# Patient Record
Sex: FEMALE | Race: WHITE | NOT HISPANIC OR LATINO | URBAN - METROPOLITAN AREA
[De-identification: names, ages, dates, MRNs, and addresses within clinical notes are randomized per-mention and may not be internally consistent; named-entity substitution may affect disease eponyms.]

---

## 2021-03-16 ENCOUNTER — INPATIENT (INPATIENT)
Facility: HOSPITAL | Age: 31
LOS: 0 days | Discharge: HOME | DRG: 897 | End: 2021-03-17
Attending: INTERNAL MEDICINE | Admitting: INTERNAL MEDICINE
Payer: COMMERCIAL

## 2021-03-16 VITALS
HEIGHT: 65 IN | DIASTOLIC BLOOD PRESSURE: 98 MMHG | TEMPERATURE: 98 F | RESPIRATION RATE: 18 BRPM | WEIGHT: 160.06 LBS | SYSTOLIC BLOOD PRESSURE: 144 MMHG | OXYGEN SATURATION: 99 % | HEART RATE: 116 BPM

## 2021-03-16 DIAGNOSIS — F10.10 ALCOHOL ABUSE, UNCOMPLICATED: ICD-10-CM

## 2021-03-16 DIAGNOSIS — R63.8 OTHER SYMPTOMS AND SIGNS CONCERNING FOOD AND FLUID INTAKE: ICD-10-CM

## 2021-03-16 DIAGNOSIS — F19.239 OTHER PSYCHOACTIVE SUBSTANCE DEPENDENCE WITH WITHDRAWAL, UNSPECIFIED: ICD-10-CM

## 2021-03-16 DIAGNOSIS — R74.01 ELEVATION OF LEVELS OF LIVER TRANSAMINASE LEVELS: ICD-10-CM

## 2021-03-16 DIAGNOSIS — F41.9 ANXIETY DISORDER, UNSPECIFIED: ICD-10-CM

## 2021-03-16 LAB
ALBUMIN SERPL ELPH-MCNC: 5.2 G/DL — HIGH (ref 3.3–5)
ALP SERPL-CCNC: 108 U/L — SIGNIFICANT CHANGE UP (ref 40–120)
ALT FLD-CCNC: 91 U/L — HIGH (ref 10–45)
ANION GAP SERPL CALC-SCNC: 18 MMOL/L — HIGH (ref 5–17)
APAP SERPL-MCNC: <5 UG/ML — LOW (ref 10–30)
APPEARANCE UR: CLEAR — SIGNIFICANT CHANGE UP
AST SERPL-CCNC: 95 U/L — HIGH (ref 10–40)
BASOPHILS # BLD AUTO: 0.05 K/UL — SIGNIFICANT CHANGE UP (ref 0–0.2)
BASOPHILS NFR BLD AUTO: 1 % — SIGNIFICANT CHANGE UP (ref 0–2)
BILIRUB SERPL-MCNC: 0.2 MG/DL — SIGNIFICANT CHANGE UP (ref 0.2–1.2)
BILIRUB UR-MCNC: NEGATIVE — SIGNIFICANT CHANGE UP
BUN SERPL-MCNC: 6 MG/DL — LOW (ref 7–23)
CALCIUM SERPL-MCNC: 9.9 MG/DL — SIGNIFICANT CHANGE UP (ref 8.4–10.5)
CHLORIDE SERPL-SCNC: 96 MMOL/L — SIGNIFICANT CHANGE UP (ref 96–108)
CO2 SERPL-SCNC: 26 MMOL/L — SIGNIFICANT CHANGE UP (ref 22–31)
COLOR SPEC: YELLOW — SIGNIFICANT CHANGE UP
CREAT SERPL-MCNC: 0.83 MG/DL — SIGNIFICANT CHANGE UP (ref 0.5–1.3)
DIFF PNL FLD: NEGATIVE — SIGNIFICANT CHANGE UP
EOSINOPHIL # BLD AUTO: 0.02 K/UL — SIGNIFICANT CHANGE UP (ref 0–0.5)
EOSINOPHIL NFR BLD AUTO: 0.4 % — SIGNIFICANT CHANGE UP (ref 0–6)
ETHANOL SERPL-MCNC: 406 MG/DL — HIGH (ref 0–10)
GLUCOSE SERPL-MCNC: 97 MG/DL — SIGNIFICANT CHANGE UP (ref 70–99)
GLUCOSE UR QL: NEGATIVE — SIGNIFICANT CHANGE UP
HCG SERPL-ACNC: <0 MIU/ML — SIGNIFICANT CHANGE UP
HCT VFR BLD CALC: 39.8 % — SIGNIFICANT CHANGE UP (ref 34.5–45)
HGB BLD-MCNC: 13.6 G/DL — SIGNIFICANT CHANGE UP (ref 11.5–15.5)
IMM GRANULOCYTES NFR BLD AUTO: 0.2 % — SIGNIFICANT CHANGE UP (ref 0–1.5)
KETONES UR-MCNC: NEGATIVE — SIGNIFICANT CHANGE UP
LACTATE SERPL-SCNC: 3.8 MMOL/L — HIGH (ref 0.5–2)
LACTATE SERPL-SCNC: 5.6 MMOL/L — CRITICAL HIGH (ref 0.5–2)
LEUKOCYTE ESTERASE UR-ACNC: NEGATIVE — SIGNIFICANT CHANGE UP
LYMPHOCYTES # BLD AUTO: 2.32 K/UL — SIGNIFICANT CHANGE UP (ref 1–3.3)
LYMPHOCYTES # BLD AUTO: 45.2 % — HIGH (ref 13–44)
MCHC RBC-ENTMCNC: 32.6 PG — SIGNIFICANT CHANGE UP (ref 27–34)
MCHC RBC-ENTMCNC: 34.2 GM/DL — SIGNIFICANT CHANGE UP (ref 32–36)
MCV RBC AUTO: 95.4 FL — SIGNIFICANT CHANGE UP (ref 80–100)
MONOCYTES # BLD AUTO: 0.29 K/UL — SIGNIFICANT CHANGE UP (ref 0–0.9)
MONOCYTES NFR BLD AUTO: 5.7 % — SIGNIFICANT CHANGE UP (ref 2–14)
NEUTROPHILS # BLD AUTO: 2.44 K/UL — SIGNIFICANT CHANGE UP (ref 1.8–7.4)
NEUTROPHILS NFR BLD AUTO: 47.5 % — SIGNIFICANT CHANGE UP (ref 43–77)
NITRITE UR-MCNC: NEGATIVE — SIGNIFICANT CHANGE UP
NRBC # BLD: 0 /100 WBCS — SIGNIFICANT CHANGE UP (ref 0–0)
PCP SPEC-MCNC: SIGNIFICANT CHANGE UP
PH UR: 6 — SIGNIFICANT CHANGE UP (ref 5–8)
PLATELET # BLD AUTO: 327 K/UL — SIGNIFICANT CHANGE UP (ref 150–400)
POTASSIUM SERPL-MCNC: 3.9 MMOL/L — SIGNIFICANT CHANGE UP (ref 3.5–5.3)
POTASSIUM SERPL-SCNC: 3.9 MMOL/L — SIGNIFICANT CHANGE UP (ref 3.5–5.3)
PROT SERPL-MCNC: 8.5 G/DL — HIGH (ref 6–8.3)
PROT UR-MCNC: NEGATIVE MG/DL — SIGNIFICANT CHANGE UP
RBC # BLD: 4.17 M/UL — SIGNIFICANT CHANGE UP (ref 3.8–5.2)
RBC # FLD: 14.5 % — SIGNIFICANT CHANGE UP (ref 10.3–14.5)
SALICYLATES SERPL-MCNC: <0.3 MG/DL — LOW (ref 2.8–20)
SARS-COV-2 RNA SPEC QL NAA+PROBE: SIGNIFICANT CHANGE UP
SODIUM SERPL-SCNC: 140 MMOL/L — SIGNIFICANT CHANGE UP (ref 135–145)
SP GR SPEC: <=1.005 — SIGNIFICANT CHANGE UP (ref 1–1.03)
UROBILINOGEN FLD QL: 0.2 E.U./DL — SIGNIFICANT CHANGE UP
WBC # BLD: 5.13 K/UL — SIGNIFICANT CHANGE UP (ref 3.8–10.5)
WBC # FLD AUTO: 5.13 K/UL — SIGNIFICANT CHANGE UP (ref 3.8–10.5)

## 2021-03-16 PROCEDURE — 93010 ELECTROCARDIOGRAM REPORT: CPT

## 2021-03-16 PROCEDURE — 99291 CRITICAL CARE FIRST HOUR: CPT

## 2021-03-16 PROCEDURE — 70450 CT HEAD/BRAIN W/O DYE: CPT | Mod: 26,MA

## 2021-03-16 PROCEDURE — 99222 1ST HOSP IP/OBS MODERATE 55: CPT | Mod: GC

## 2021-03-16 RX ORDER — SODIUM CHLORIDE 9 MG/ML
1000 INJECTION INTRAMUSCULAR; INTRAVENOUS; SUBCUTANEOUS ONCE
Refills: 0 | Status: COMPLETED | OUTPATIENT
Start: 2021-03-16 | End: 2021-03-16

## 2021-03-16 RX ORDER — THIAMINE MONONITRATE (VIT B1) 100 MG
100 TABLET ORAL DAILY
Refills: 0 | Status: DISCONTINUED | OUTPATIENT
Start: 2021-03-16 | End: 2021-03-17

## 2021-03-16 RX ORDER — LEVETIRACETAM 250 MG/1
1 TABLET, FILM COATED ORAL
Qty: 0 | Refills: 0 | DISCHARGE

## 2021-03-16 RX ORDER — LEVETIRACETAM 250 MG/1
1000 TABLET, FILM COATED ORAL ONCE
Refills: 0 | Status: COMPLETED | OUTPATIENT
Start: 2021-03-16 | End: 2021-03-16

## 2021-03-16 RX ORDER — ESCITALOPRAM OXALATE 10 MG/1
20 TABLET, FILM COATED ORAL DAILY
Refills: 0 | Status: DISCONTINUED | OUTPATIENT
Start: 2021-03-17 | End: 2021-03-17

## 2021-03-16 RX ORDER — FOLIC ACID 0.8 MG
1 TABLET ORAL DAILY
Refills: 0 | Status: DISCONTINUED | OUTPATIENT
Start: 2021-03-16 | End: 2021-03-17

## 2021-03-16 RX ORDER — LEVETIRACETAM 250 MG/1
1000 TABLET, FILM COATED ORAL
Refills: 0 | Status: DISCONTINUED | OUTPATIENT
Start: 2021-03-17 | End: 2021-03-17

## 2021-03-16 RX ORDER — TRAZODONE HCL 50 MG
1 TABLET ORAL
Qty: 0 | Refills: 0 | DISCHARGE

## 2021-03-16 RX ORDER — METHOCARBAMOL 500 MG/1
0 TABLET, FILM COATED ORAL
Qty: 0 | Refills: 0 | DISCHARGE

## 2021-03-16 RX ORDER — HYDROXYZINE HCL 10 MG
1 TABLET ORAL
Qty: 0 | Refills: 0 | DISCHARGE

## 2021-03-16 RX ORDER — GABAPENTIN 400 MG/1
1 CAPSULE ORAL
Qty: 0 | Refills: 0 | DISCHARGE

## 2021-03-16 RX ORDER — ESCITALOPRAM OXALATE 10 MG/1
1 TABLET, FILM COATED ORAL
Qty: 0 | Refills: 0 | DISCHARGE

## 2021-03-16 RX ADMIN — Medication 100 MILLIGRAM(S): at 22:51

## 2021-03-16 RX ADMIN — Medication 1 MILLIGRAM(S): at 22:51

## 2021-03-16 RX ADMIN — LEVETIRACETAM 400 MILLIGRAM(S): 250 TABLET, FILM COATED ORAL at 19:05

## 2021-03-16 RX ADMIN — Medication 1 MILLIGRAM(S): at 22:15

## 2021-03-16 RX ADMIN — Medication 25 MILLIGRAM(S): at 20:31

## 2021-03-16 RX ADMIN — SODIUM CHLORIDE 1000 MILLILITER(S): 9 INJECTION INTRAMUSCULAR; INTRAVENOUS; SUBCUTANEOUS at 22:08

## 2021-03-16 RX ADMIN — SODIUM CHLORIDE 1000 MILLILITER(S): 9 INJECTION INTRAMUSCULAR; INTRAVENOUS; SUBCUTANEOUS at 22:29

## 2021-03-16 RX ADMIN — Medication 1 MILLIGRAM(S): at 22:17

## 2021-03-16 RX ADMIN — LEVETIRACETAM 1000 MILLIGRAM(S): 250 TABLET, FILM COATED ORAL at 22:08

## 2021-03-16 RX ADMIN — SODIUM CHLORIDE 1000 MILLILITER(S): 9 INJECTION INTRAMUSCULAR; INTRAVENOUS; SUBCUTANEOUS at 20:04

## 2021-03-16 RX ADMIN — Medication 1 MILLIGRAM(S): at 22:29

## 2021-03-16 RX ADMIN — Medication 1 TABLET(S): at 22:51

## 2021-03-16 RX ADMIN — SODIUM CHLORIDE 1000 MILLILITER(S): 9 INJECTION INTRAMUSCULAR; INTRAVENOUS; SUBCUTANEOUS at 19:05

## 2021-03-16 RX ADMIN — Medication 1 MILLIGRAM(S): at 21:27

## 2021-03-16 NOTE — ED PROVIDER NOTE - PHYSICAL EXAMINATION
GEN: Well developed, obese, intoxicated, awake, alert, oriented to person, place, anxious, seized on arrival to the ER. NTAF  ENT: Airway patent, Nasal mucosa clear. Mouth with normal mucosa.  EYES: Clear bilaterally. PERRL, EOMI  RESPIRATORY: Breathing comfortably with normal RR. No W/C/R, no hypoxia or resp distress.  CARDIAC: Regular rate and rhythm, no M/R/G  ABDOMEN: Soft, nontender, +bowel sounds, no rebound, rigidity, or guarding.  MSK: Range of motion is not limited, no deformities noted.  NEURO: Alert and oriented x 2 (thought it was Wednesday). Cn 2-12 intact. Strength 5/5 and sensation intact in all 4 extremities. no tremors. Gait normal.   SKIN: Skin normal color for race, warm, dry and intact. Resolving bruising to forehead  PSYCH: Alert and oriented to person, place, anxious mood and affect. no apparent risk to self or others.

## 2021-03-16 NOTE — CONSULT NOTE ADULT - ATTENDING COMMENTS
etoh withdrawal  etoh associated SZ  dehydratino with dry MM, she stated she has not been drinking or eating for days  she repeatedly stated she watned to leave from ED to go to detox rehab despite being informed about risks of recurrent SZ, and meredith tshe was not medicably stable for rehab.    treating with keppra  admit to medical stepdown to monitor for progression of withdrawal and recurrent SZ  treat with iv benzo as required for symptomatic withdrawal.   does not need necessarily 2mg ativan q2.  IVF for dehydration   patient does not have capacity to sign out AMA currently etoh withdrawal  etoh associated SZ  dehydratino with dry MM, she stated she has not been drinking or eating for days  she repeatedly stated she watned to leave from ED to go to detox rehab despite being informed about risks of recurrent SZ, and meredith tshe was not medicably stable for rehab.    treating with keppra  admit to medical stepdown to monitor for progression of withdrawal and recurrent SZ  treat with iv benzo as required for symptomatic withdrawal.   does not need necessarily 2mg ativan q2.    see also H & P authored by Dr. Martinez this evening  IVF for dehydration   patient does not have capacity to sign out AMA currently

## 2021-03-16 NOTE — H&P ADULT - ASSESSMENT
Ms. Radha Valente is a 30 year old female with medical history significant for anxiety, depression, alcohol abuse disorder who presented to the ED this evening for EtOH withdrawal complicated by 3 seizures, one of which was witnessed in the ED, admitted to  for management and monitoring of EtOH withdrawal and seizure.

## 2021-03-16 NOTE — ED PROVIDER NOTE - CLINICAL SUMMARY MEDICAL DECISION MAKING FREE TEXT BOX
30F with a h/o anxiety and alcohol abuse with hx of alcohol withdrawal seizures who p/w seizure. Pt recently relapsed after a 5 days detox program and has been binge drinking for the past few days (~25 beers per day). Tonight she was on her way to detox when she had 3 seizures, tonic clonic that lasted about 1 minute each, last was on stretched while EMS was transporting pt into the ED. Pt states her last drink was about 6 hours ago, she c/o feeling shakey and anxious, no skin crawling, no nausea or vomits, no headache, no si/hi. States she takes keppra 500 mg BID rx'd by her neuropsych, but she denies hx of epilepsy.   CIWA 10, VS notable for tachycardia, pt appears dry on exam. EKG ST no stemi, Plan for labs, IV hydration, CT head r/o ICH. IV keppra given for sz prophylaxis.     Pt given librium and ativan for increasing anxiety, she tried to elope from ER, cannot leave AMA at ETOH level > 400s and pt does not have capacity at this time and high risk for recurrent seizure. EPilepsy was consulted and will see her during admission. ICU consulted for admission to monitored bed given tachycardia and high risk for seizure.

## 2021-03-16 NOTE — CONSULT NOTE ADULT - SUBJECTIVE AND OBJECTIVE BOX
***ICU CONSULT****    TERRY SIMS  30y  Female    30yoF with a PMH of eoth abuse, seizures, anxiety, and depression presents with seizures and etoh withdrawal. Patient was on her way to [facility] when she had a tonic clonic seizure that lasted one minute and self terminated. She had another seizure on the way to [facility] and one witnessed seizure in the ED. Last drink was 6-8 hours ago, pt reports she drinks 10-12 hard seltzers per day but told another staff members its closer to 30. Her partner Christy states that the patient has 2-3 tonic clonic seizure daily at home whenever she is drinking. Last December she admitted to an outside hospital for eoth withdrawal, wasn't intubated. There she had an EEG which reportedly did not show evidence of epilepsy. On arrival she was tachycardic NSR in the 110/120's but otherwise AF VSS on room air. Exam remarkable for anxiety and tremors but otherwise benign. Labs s/f lactate 5.6, anion gap of 18, and LOREN of 400. EKG NSR tachycardic QTc 420. CTH unremarkable. In the ED was given 4mg ativan and 25mg of librium plus 3L of NS.          PAST MEDICAL/SURGICAL HISTORY  PAST MEDICAL & SURGICAL HISTORY:  Anxiety    Withdrawal seizures    Alcohol abuse        REVIEW OF SYSTEMS:  CONSTITUTIONAL: No fever, weight loss, or fatigue  EYES: No eye pain, visual disturbances, or discharge  ENMT:  No difficulty hearing, tinnitus, vertigo; No sinus or throat pain  NECK: No pain or stiffness  BREASTS: No pain, masses, or nipple discharge  RESPIRATORY: No cough, wheezing, chills or hemoptysis; No shortness of breath  CARDIOVASCULAR: No chest pain, palpitations, dizziness, or leg swelling  GASTROINTESTINAL: No abdominal or epigastric pain. No nausea, vomiting, or hematemesis; No diarrhea or constipation. No melena or hematochezia.  GENITOURINARY: No dysuria, frequency, hematuria, or incontinence  NEUROLOGICAL: No headaches, memory loss, loss of strength, numbness, or tremors  SKIN: No itching, burning, rashes, or lesions   LYMPH NODES: No enlarged glands  ENDOCRINE: No heat or cold intolerance; No hair loss  MUSCULOSKELETAL: No joint pain or swelling; No muscle, back, or extremity pain  PSYCHIATRIC: per HPI  HEME/LYMPH: No easy bruising, or bleeding gums  ALLERY AND IMMUNOLOGIC: No hives or eczema    T(C): 36.7 (03-16-21 @ 23:00), Max: 36.7 (03-16-21 @ 23:00)  HR: 89 (03-16-21 @ 23:00) (89 - 116)  BP: 118/77 (03-16-21 @ 23:00) (115/81 - 144/98)  RR: 16 (03-16-21 @ 23:00) (16 - 18)  SpO2: 98% (03-16-21 @ 23:00) (98% - 100%)  Wt(kg): --Vital Signs Last 24 Hrs  T(C): 36.7 (16 Mar 2021 23:00), Max: 36.7 (16 Mar 2021 23:00)  T(F): 98 (16 Mar 2021 23:00), Max: 98 (16 Mar 2021 23:00)  HR: 89 (16 Mar 2021 23:00) (89 - 116)  BP: 118/77 (16 Mar 2021 23:00) (115/81 - 144/98)  BP(mean): --  RR: 16 (16 Mar 2021 23:00) (16 - 18)  SpO2: 98% (16 Mar 2021 23:00) (98% - 100%)    PHYSICAL EXAM:  GENERAL: NAD, well-groomed, well-developed  HEAD:  Atraumatic, Normocephalic  EYES: EOMI, PERRLA, conjunctiva and sclera clear  ENMT: No tonsillar erythema, exudates, or enlargement; Moist mucous membranes, Good dentition, No lesions  NECK: Supple, No JVD, Normal thyroid  NERVOUS SYSTEM:  Alert & Oriented X3, Good concentration; Motor Strength 5/5 B/L upper and lower extremities; DTRs 2+ intact and symmetric  CHEST/LUNG: Clear to percussion bilaterally; No rales, rhonchi, wheezing, or rubs  HEART: Regular rate and rhythm; No murmurs, rubs, or gallops  ABDOMEN: Soft, Nontender, Nondistended; Bowel sounds present  EXTREMITIES:  2+ Peripheral Pulses, No clubbing, cyanosis, or edema  LYMPH: No lymphadenopathy noted  SKIN: No rashes or lesions    Consultant(s) Notes Reviewed:  [x ] YES  [ ] NO  Care Discussed with Consultants/Other Providers [ x] YES  [ ] NO    LABS:  CBC   03-16-21 @ 19:02  Hematcorit 39.8  Hemoglobin 13.6  Mean Cell Hemoglobin 32.6  Platelet Count-Automated 327  RBC Count 4.17  Red Cell Distrib Width 14.5  Wbc Count 5.13      BMP  03-16-21 @ 19:02  Anion Gap. Serum 18  Blood Urea Nitrogen,Serm 6  Calcium, Total Serum 9.9  Carbon Dioxide, Serum 26  Chloride, Serum 96  Creatinine, Serum 0.83  eGFR in African American 110  eGFR in Non Afican American 95  Gloucose, serum 97  Potassium, Serum 3.9  Sodium, Serum 140                  CMP  03-16-21 @ 19:02  Antoinette Aminotransferase(ALT/SGPT)91  Albumin, Serum 5.2  Alkaline Phosphatase, Serum 108  Anion Gap, Serum 18  Aspartate Aminotransferase (AST/SGOT)95  Bilirubin Total, Serum 0.2  Blood Urea Nitrogen, Serum 6  Calcium,Total Serum 9.9  Carbon Dioxide, Serum 26  Chloride, Serum 96  Creatinine, Serum 0.83  eGFR if  110  eGFR if Non African American 95  Glucose, Serum 97  Potassium, Serum 3.9  Protein Total, Serum 8.5  Sodium, Serum 140                          PT/INR      Amylase/Lipase            RADIOLOGY & ADDITIONAL TESTS:    Imaging Personally Reviewed:  [ ] YES  [ ] NO

## 2021-03-16 NOTE — H&P ADULT - PROBLEM SELECTOR PLAN 3
Patient has significant history of anxiety and depression for which is seen by outside neuropsychiatrist.   -Takes Atarax, lexapro, Buspar, Trazodone, Gabapentin at home  -Will continue with lexapro, buspirone here

## 2021-03-16 NOTE — ED ADULT TRIAGE NOTE - DOMESTIC TRAVEL HIGH RISK QUESTION
Pt presents to the [facility] today and states:    HX: \"I have had a fever of 102.5. I woke up and vomited this morning. My joints ache and hurt, my eyes burn and it hurts to keep them open, as it feels like there is a lot of pressure on them. I don't have any appetite. My stomach cramps when I eat or drink water.\"   Pt has taken tylenol and Alkaselser with little relief.   Pt did take tylenol at 9:15 AM.    SX are: Fever, joint aches, eye pain and pressure, stomach cramps, no appetite, vomiting.     SX present for: X 2 days       No

## 2021-03-16 NOTE — CONSULT NOTE ADULT - ASSESSMENT
30yoF with a PMH of eoth abuse, seizures, anxiety, and depression presents with seizures and etoh withdrawal    # etoh withdrawal c/b seizures  - between 10-30 hard seltzers per day, last drink 6-8hrs ago, on arrival had a LOREN of 400  - s/p 3 tonic clonic seizures lasting one minute that self terminated. One witnessed  - previous admission to OSH for etoh/seizures, never intubated, reportedly prior EEG showed no e/o epilepsy  - pt is tachycardic but otherwise AF VSS, exam + for tremors and anxiety, she is protecting her airways  RECOMMENDATIONS  - would start ativan 2mg IVP q2hrs  - CIWA protocol  - would obtain her prior EEG data  - pt wanted to AMA but she does currently have capacity due to being acutely intoxicated  - admit to [facility]    d/w ICU and ED attending

## 2021-03-16 NOTE — H&P ADULT - PROBLEM SELECTOR PLAN 2
Patient with three tonic clonic seizures today, one of which was witnessed in ED. Likely 2/2 to EtOH withdrawal, though purportedly on Keppra at home for reasons other than Epilepsy. Had seizure workup at OSH in December which was negative.    -Continue to manage EtOh withdrawal as above  -Monitor on telemetry unit Patient with three tonic clonic seizures today, one of which was witnessed in ED. Likely 2/2 to EtOH withdrawal, though purportedly on Keppra at home for reasons other than Epilepsy. Had seizure workup at OSH in December which was negative. Lactate on admission 5 now 3.    -Continue to manage EtOh withdrawal as above  -Monitor on telemetry unit  -Will trend lactate to clear

## 2021-03-16 NOTE — H&P ADULT - NSHPPHYSICALEXAM_GEN_ALL_CORE
.  VITAL SIGNS:  T(C): 36.7 (03-16-21 @ 23:00), Max: 36.7 (03-16-21 @ 23:00)  T(F): 98 (03-16-21 @ 23:00), Max: 98 (03-16-21 @ 23:00)  HR: 89 (03-16-21 @ 23:00) (89 - 116)  BP: 118/77 (03-16-21 @ 23:00) (115/81 - 144/98)  BP(mean): --  RR: 16 (03-16-21 @ 23:00) (16 - 18)  SpO2: 98% (03-16-21 @ 23:00) (98% - 100%)  Wt(kg): --    PHYSICAL EXAM:    Constitutional: WDWN resting comfortably in bed; NAD  Head: NC/AT  Eyes: PERRL, EOMI, clear conjunctiva  ENT: no nasal discharge; uvula midline, no oropharyngeal erythema or exudates; MMM  Neck: supple; no JVD or thyromegaly  Respiratory: CTA B/L; no W/R/R, no retractions  Cardiac: +S1/S2; RRR; no M/R/G; PMI non-displaced  Gastrointestinal: soft, NT/ND; no rebound or guarding; +BSx4  Genitourinary: normal external genitalia  Back: spine midline, no bony tenderness or step-offs; no CVAT B/L  Extremities: WWP, no clubbing or cyanosis; no peripheral edema  Musculoskeletal: NROM x4; no joint swelling, tenderness or erythema  Vascular: 2+ radial, femoral, DP/PT pulses B/L  Dermatologic: skin warm, dry and intact; no rashes, wounds, or scars  Lymphatic: no submandibular or cervical LAD  Neurologic: AAOx3; CNII-XII grossly intact; no focal deficits  Psychiatric: affect and characteristics of appearance, verbalizations, behaviors are appropriate .  VITAL SIGNS:  T(C): 36.7 (03-16-21 @ 23:00), Max: 36.7 (03-16-21 @ 23:00)  T(F): 98 (03-16-21 @ 23:00), Max: 98 (03-16-21 @ 23:00)  HR: 89 (03-16-21 @ 23:00) (89 - 116)  BP: 118/77 (03-16-21 @ 23:00) (115/81 - 144/98)  BP(mean): --  RR: 16 (03-16-21 @ 23:00) (16 - 18)  SpO2: 98% (03-16-21 @ 23:00) (98% - 100%)  Wt(kg): --    PHYSICAL EXAM:    Constitutional: WDWN resting comfortably in bed; NAD  Head: NC/AT  Eyes: PERRL, EOMI, clear conjunctiva  ENT: no nasal discharge; uvula midline, no oropharyngeal erythema or exudates; MMM  Neck: supple; no JVD or thyromegaly  Respiratory: CTA B/L; no W/R/R, no retractions  Cardiac: tachycardic, but regular. no murmurs appreciated.   Gastrointestinal: soft, NT/ND; no rebound or guarding; +BSx4  Extremities: WWP, no clubbing or cyanosis; no peripheral edema  Vascular: 2+ radial, femoral, DP/PT pulses B/L  Lymphatic: no submandibular or cervical LAD  Neurologic: AAOx3; CNII-XII grossly intact; no focal deficits, Mild tremor noted at finger tips. No tongue fasciculation. No diaphoresis noted.   CIWA: 3-for anxiety, headache, mild tremor  Psychiatric: affect and characteristics of appearance, verbalizations, behaviors are appropriate

## 2021-03-16 NOTE — H&P ADULT - NSICDXPASTMEDICALHX_GEN_ALL_CORE_FT
PAST MEDICAL HISTORY:  Alcohol abuse     Anxiety     Anxiety and depression     Withdrawal seizures

## 2021-03-16 NOTE — ED ADULT NURSE NOTE - OBJECTIVE STATEMENT
Pt is a 30y female BIBA for reported seizure @530pm today. Pt states, "I think I seized for a few minutes, I was at my detox, im not sure." EMS reports pt seizing for about 1 min as rolling into ED. In ED, pt is awake and alert. Alert and oriented x 4. , /81, 02 sat 100% RA. hx of alcohol abuse. hx of seizures from withdrawal. Pt's last drink was about 6 hrs ago. Pt typically drinks 24 beers a day. Reports smoking 4 cigarettes a day. denies drug use. pt stopped taking her Keppra 2 days ago because she was drinking and forgot. Hx depression, anxiety. Self harm scars noted to wrists bilaterally. Denies SI, HI. denies CP, SOB, N/V, fever, chills, numbness tingling, headache. Respirations even and unlabored. 20G R AC placed in ED. Labs sent. Placed on CCM.  Keppra and fluids running.

## 2021-03-16 NOTE — H&P ADULT - HISTORY OF PRESENT ILLNESS
Ms. Radha Valente is a 30 year old female with medical history significant for anxiety, depression, alcohol abuse disorder who presented to the ED this evening for EtOH withdrawal complicated by 3 seizures, one of which was witnessed in the ED. Notes that she had recently been discharged from a detox program 1 week ago, and 5 days ago relapsed, and started to drink heavily again. She had been on her way back to the Detox facility this evening and had her first of three seizures, all of which were tonic clonic and lasted about 1 minute each. Her last seizure was on the stretcher in the ED. She states that typically she drinks 15-25 beers daily, and her last drink was 6 hours ago. She was recently hospitalized in December at an outside hospital for alcohol withdrawal and seizures, and at that time was worked up for epilepsy with vEEg, which she recalls was negative. She has never been intubated, but has a significant history of EtOh withdrawal seizures, and as per her partner who was at bedside, is very sensitive to changes in her blood alcohol level and has seized at home after cessation of drinking. She also has history of Delirium Tremens in the past, and notes having had auditory hallucinations.   She denies any other drug use, though she endorses cigarette use, smokes about 4 cigarettes daily. Of note, she recently was prescribed Keppra, not for seizures, but for her alcohol addiction, which she stopped taking two days ago d/t drinking. Currently denying SI, CP, SOB, N/v, chills. She is actively trying to elope from ED; however, is now amenable to staying.   ED Vitals: 97.7, 144/98, , RR 18, 99% RA.   ED Labs: CBC wnl, BMP s/f elevated AST (95) ALT (91), Lactate 5.6 > 3.8, LOREN 406, Utox negative, UA negative, CTH negative.   ED Course: 4x1mg Ativan, 25mg Librium, 1g Keppra, 2L NaCl, Multivitamin, Thiamine, Folic Acid. ICU consulted for Alcohol withdrawal and seizures.           Ms. Radha Valente is a 30 year old female with medical history significant for anxiety, depression, alcohol abuse disorder who presented to the ED this evening for EtOH withdrawal complicated by 3 seizures, one of which was witnessed in the ED. Notes that she had recently been discharged from a detox program 1 week ago, and 5 days ago relapsed, and started to drink heavily again. She had been on her way back to the Detox facility this evening and had her first of three seizures, all of which were tonic clonic and lasted about 1 minute each. Her last seizure was on the stretcher in the ED. She states that typically she drinks 15-25 beers daily, and her last drink was 6 hours ago. She was recently hospitalized in December at an outside hospital for alcohol withdrawal and seizures, and at that time was worked up for epilepsy with vEEg, which she recalls was negative. She has never been intubated, but has a significant history of EtOh withdrawal seizures, and as per her partner who was at bedside, is very sensitive to changes in her blood alcohol level and has seized at home after cessation of drinking. She also has history of Delirium Tremens in the past, and notes having had auditory hallucinations.   She denies any other drug use, though she endorses cigarette use, smokes about 4 cigarettes daily. Of note, she recently was prescribed Keppra, not for seizures, but for her alcohol addiction, which she stopped taking two days ago d/t drinking.    Currently denying SI, CP, SOB, N/v, chills, auditory or visual hallucination, formication. She is actively trying to elope from ED; however, is now amenable to staying.     ED Vitals: 97.7, 144/98, , RR 18, 99% RA.   ED Labs: CBC wnl, BMP s/f elevated AST (95) ALT (91), Lactate 5.6 > 3.8, LOREN 406, Utox negative, UA negative, CTH negative.   ED Course: 4x1mg Ativan, 25mg Librium, 1g Keppra, 2L NaCl, Multivitamin, Thiamine, Folic Acid. ICU consulted for Alcohol withdrawal and seizures.

## 2021-03-16 NOTE — H&P ADULT - PROBLEM SELECTOR PLAN 4
Patient presented with AST and ALT elevated at 91 and 95 respectively. Likely secondary to EtoH abuse.   -f/u viral hepatitis panel  -continue to monitor

## 2021-03-16 NOTE — ED PROVIDER NOTE - OBJECTIVE STATEMENT
30F with a h/o anxiety and alcohol abuse with hx of alcohol withdrawal seizures who p/w seizure. Pt recently relapsed after a 5 days detox program and has been binge drinking for the past few days (~25 beers per day). Tonight she was on her way to detox when she had 3 seizures, tonic clonic that lasted about 1 minute each, last was on stretched while EMS was transporting pt into the ED. Pt states her last drink was about 6 hours ago, she c/o feeling shakey and anxious, no skin crawling, no nausea or vomits, no headache, no si/hi

## 2021-03-16 NOTE — ED PROVIDER NOTE - CARE PLAN
Principal Discharge DX:	Seizures  Secondary Diagnosis:	Alcohol abuse  Secondary Diagnosis:	Anxiety

## 2021-03-16 NOTE — H&P ADULT - NSHPLABSRESULTS_GEN_ALL_CORE
.  LABS:                         13.6   5.13  )-----------( 327      ( 16 Mar 2021 19:02 )             39.8     03-16    140  |  96  |  6<L>  ----------------------------<  97  3.9   |  26  |  0.83    Ca    9.9      16 Mar 2021 19:02    TPro  8.5<H>  /  Alb  5.2<H>  /  TBili  0.2  /  DBili  x   /  AST  95<H>  /  ALT  91<H>  /  AlkPhos  108  03-16      Urinalysis Basic - ( 16 Mar 2021 19:05 )    Color: Yellow / Appearance: Clear / SG: <=1.005 / pH: x  Gluc: x / Ketone: NEGATIVE  / Bili: Negative / Urobili: 0.2 E.U./dL   Blood: x / Protein: NEGATIVE mg/dL / Nitrite: NEGATIVE   Leuk Esterase: NEGATIVE / RBC: x / WBC x   Sq Epi: x / Non Sq Epi: x / Bacteria: x            Lactate, Blood: 3.8 mmol/L (03-16 @ 22:02)  Lactate, Blood: 5.6 mmol/L (03-16 @ 19:01)      RADIOLOGY, EKG & ADDITIONAL TESTS: Reviewed.

## 2021-03-16 NOTE — H&P ADULT - PROBLEM SELECTOR PLAN 1
Pt has significant history of alcohol abuse, has history of seizures, delirium tremens. Has had three tonic clonic seizures, one of which witnessed in ED. Plan for admit to [facility] for continuous monitoring on telemetry given high risk of seizure and complications of EtOh withdrawl.   -Placed patient on High Risk CIWA protocol  -Ativan 2mg q2h PRN for CIWA greater than 8  -Folic Acid/multivitamin/thiamine   -Due to the fact that she has been enrolled in detox facility multiple times but keeps relapsing, may benefit from psychiatry consult. Pt has significant history of alcohol abuse, has history of seizures, delirium tremens. Has had three tonic clonic seizures, one of which witnessed in ED. Plan for admit to [facility] for continuous monitoring on telemetry given high risk of seizure and complications of EtOh withdrawl. Currently CIWA 3.   -Placed patient on High Risk CIWA protocol  -Ativan 1mg q2h PRN for CIWA greater than 8  -Folic Acid/multivitamin/thiamine   -Due to the fact that she has been enrolled in detox facility multiple times but keeps relapsing, may benefit from psychiatry consult.

## 2021-03-16 NOTE — ED ADULT TRIAGE NOTE - BP NONINVASIVE SYSTOLIC (MM HG)
Assessment/Plan:      Diagnoses and all orders for this visit:    Acute non intractable tension-type headache    Depression, controlled  -     PARoxetine (PAXIL) 20 mg tablet; Take 1 tablet (20 mg total) by mouth daily for 30 days      Note given to patient for work  Pt to be off from work for 2 days due to stress, headaches and anxiety  If headaches do not resolve with over-the-counter Tylenol or Aleve patient should return for further workup  Subjective:   Patient ID: Reyes Spitz is a 47 y o  female  HPI   Stellamg Nitin is here today after an event at work has caused her significant stress and fatigue in her life  Patient works at [employer]  Patient was disposing of soiled trash and linens in waste room, when door behind her closed and locked  Patient was unable to be freed from door for over 30 min  Patient brought in a signed note from staff who documented this event  She describes the odor as very foul, with poor air flow  She felt trapped, and began having headaches ever since  Her colleagues recommended she come in for evaluation  She knows that she already works too many hours, and is very stressed at work, for which she takes Paxil daily  She is unsure of more needs to be done, or if the symptoms will resolve with time  Review of Systems   Constitutional: Positive for fatigue  Negative for activity change, chills and fever  HENT: Positive for rhinorrhea (Seasonal allergies  )  Negative for congestion, postnasal drip, sinus pain and sore throat  Respiratory: Negative for cough, choking, chest tightness, shortness of breath and wheezing  Cardiovascular: Negative for chest pain, palpitations and leg swelling  Gastrointestinal: Negative for abdominal pain, constipation, diarrhea, nausea and vomiting  Genitourinary: Negative for dysuria and urgency  Musculoskeletal: Negative for arthralgias and back pain  Neurological: Positive for light-headedness and headaches   Negative for dizziness, tremors and numbness  Psychiatric/Behavioral: Positive for decreased concentration  The patient is nervous/anxious  Objective:  Vitals:    06/13/18 1846   BP: 132/80   Pulse: 88   Resp: 20   SpO2: 99%      Physical Exam   Constitutional: She is oriented to person, place, and time  She appears well-developed and well-nourished  No distress  HENT:   Head: Normocephalic and atraumatic  Nose: Nose normal    Eyes: Right eye exhibits no discharge  Left eye exhibits no discharge  No scleral icterus  Neck: Normal range of motion  Neck supple  Cardiovascular: Normal rate, regular rhythm, normal heart sounds and intact distal pulses  Exam reveals no gallop and no friction rub  No murmur heard  Pulmonary/Chest: Effort normal and breath sounds normal  No stridor  No respiratory distress  She has no wheezes  She has no rales  She exhibits no tenderness  Abdominal: Soft  Bowel sounds are normal  She exhibits no distension and no mass  There is no tenderness  There is no rebound and no guarding  Musculoskeletal: Normal range of motion  She exhibits no edema, tenderness or deformity  Lymphadenopathy:     She has no cervical adenopathy  Neurological: She is alert and oriented to person, place, and time  Skin: Skin is warm and dry  No rash noted  She is not diaphoretic  No erythema  No pallor  Psychiatric: She has a normal mood and affect  Her behavior is normal  Judgment and thought content normal    Sad, upset, and distraught  Vitals reviewed  144

## 2021-03-17 ENCOUNTER — TRANSCRIPTION ENCOUNTER (OUTPATIENT)
Age: 31
End: 2021-03-17

## 2021-03-17 VITALS — TEMPERATURE: 98 F

## 2021-03-17 LAB
ALBUMIN SERPL ELPH-MCNC: 4 G/DL — SIGNIFICANT CHANGE UP (ref 3.3–5)
ALP SERPL-CCNC: 77 U/L — SIGNIFICANT CHANGE UP (ref 40–120)
ALT FLD-CCNC: 68 U/L — HIGH (ref 10–45)
ANION GAP SERPL CALC-SCNC: 13 MMOL/L — SIGNIFICANT CHANGE UP (ref 5–17)
AST SERPL-CCNC: 75 U/L — HIGH (ref 10–40)
BILIRUB SERPL-MCNC: 0.4 MG/DL — SIGNIFICANT CHANGE UP (ref 0.2–1.2)
BUN SERPL-MCNC: 7 MG/DL — SIGNIFICANT CHANGE UP (ref 7–23)
CALCIUM SERPL-MCNC: 8.9 MG/DL — SIGNIFICANT CHANGE UP (ref 8.4–10.5)
CHLORIDE SERPL-SCNC: 105 MMOL/L — SIGNIFICANT CHANGE UP (ref 96–108)
CO2 SERPL-SCNC: 21 MMOL/L — LOW (ref 22–31)
CREAT SERPL-MCNC: 0.64 MG/DL — SIGNIFICANT CHANGE UP (ref 0.5–1.3)
GLUCOSE SERPL-MCNC: 73 MG/DL — SIGNIFICANT CHANGE UP (ref 70–99)
HAV IGM SER-ACNC: SIGNIFICANT CHANGE UP
HBV CORE IGM SER-ACNC: SIGNIFICANT CHANGE UP
HBV SURFACE AG SER-ACNC: SIGNIFICANT CHANGE UP
HCT VFR BLD CALC: 33.3 % — LOW (ref 34.5–45)
HCV AB S/CO SERPL IA: 0.11 S/CO — SIGNIFICANT CHANGE UP
HCV AB SERPL-IMP: SIGNIFICANT CHANGE UP
HGB BLD-MCNC: 10.9 G/DL — LOW (ref 11.5–15.5)
LACTATE SERPL-SCNC: 2.6 MMOL/L — HIGH (ref 0.5–2)
MAGNESIUM SERPL-MCNC: 1.6 MG/DL — SIGNIFICANT CHANGE UP (ref 1.6–2.6)
MCHC RBC-ENTMCNC: 32.1 PG — SIGNIFICANT CHANGE UP (ref 27–34)
MCHC RBC-ENTMCNC: 32.7 GM/DL — SIGNIFICANT CHANGE UP (ref 32–36)
MCV RBC AUTO: 97.9 FL — SIGNIFICANT CHANGE UP (ref 80–100)
NRBC # BLD: 0 /100 WBCS — SIGNIFICANT CHANGE UP (ref 0–0)
PHOSPHATE SERPL-MCNC: 3.4 MG/DL — SIGNIFICANT CHANGE UP (ref 2.5–4.5)
PLATELET # BLD AUTO: 222 K/UL — SIGNIFICANT CHANGE UP (ref 150–400)
POTASSIUM SERPL-MCNC: 4 MMOL/L — SIGNIFICANT CHANGE UP (ref 3.5–5.3)
POTASSIUM SERPL-SCNC: 4 MMOL/L — SIGNIFICANT CHANGE UP (ref 3.5–5.3)
PROT SERPL-MCNC: 6.7 G/DL — SIGNIFICANT CHANGE UP (ref 6–8.3)
RBC # BLD: 3.4 M/UL — LOW (ref 3.8–5.2)
RBC # FLD: 14.3 % — SIGNIFICANT CHANGE UP (ref 10.3–14.5)
SODIUM SERPL-SCNC: 139 MMOL/L — SIGNIFICANT CHANGE UP (ref 135–145)
WBC # BLD: 4.28 K/UL — SIGNIFICANT CHANGE UP (ref 3.8–10.5)
WBC # FLD AUTO: 4.28 K/UL — SIGNIFICANT CHANGE UP (ref 3.8–10.5)

## 2021-03-17 PROCEDURE — 84100 ASSAY OF PHOSPHORUS: CPT

## 2021-03-17 PROCEDURE — U0005: CPT

## 2021-03-17 PROCEDURE — 85027 COMPLETE CBC AUTOMATED: CPT

## 2021-03-17 PROCEDURE — 81003 URINALYSIS AUTO W/O SCOPE: CPT

## 2021-03-17 PROCEDURE — 83735 ASSAY OF MAGNESIUM: CPT

## 2021-03-17 PROCEDURE — 86769 SARS-COV-2 COVID-19 ANTIBODY: CPT

## 2021-03-17 PROCEDURE — 83605 ASSAY OF LACTIC ACID: CPT

## 2021-03-17 PROCEDURE — 85025 COMPLETE CBC W/AUTO DIFF WBC: CPT

## 2021-03-17 PROCEDURE — 99238 HOSP IP/OBS DSCHRG MGMT 30/<: CPT | Mod: GC

## 2021-03-17 PROCEDURE — 80074 ACUTE HEPATITIS PANEL: CPT

## 2021-03-17 PROCEDURE — 99285 EMERGENCY DEPT VISIT HI MDM: CPT | Mod: XU

## 2021-03-17 PROCEDURE — 36415 COLL VENOUS BLD VENIPUNCTURE: CPT

## 2021-03-17 PROCEDURE — 84702 CHORIONIC GONADOTROPIN TEST: CPT

## 2021-03-17 PROCEDURE — 80053 COMPREHEN METABOLIC PANEL: CPT

## 2021-03-17 PROCEDURE — 80307 DRUG TEST PRSMV CHEM ANLYZR: CPT

## 2021-03-17 PROCEDURE — U0003: CPT

## 2021-03-17 PROCEDURE — 70450 CT HEAD/BRAIN W/O DYE: CPT

## 2021-03-17 PROCEDURE — 82962 GLUCOSE BLOOD TEST: CPT

## 2021-03-17 RX ORDER — THIAMINE MONONITRATE (VIT B1) 100 MG
1 TABLET ORAL
Qty: 0 | Refills: 0 | DISCHARGE
Start: 2021-03-17

## 2021-03-17 RX ORDER — FOLIC ACID 0.8 MG
1 TABLET ORAL
Qty: 0 | Refills: 0 | DISCHARGE
Start: 2021-03-17

## 2021-03-17 RX ADMIN — Medication 50 MILLIGRAM(S): at 09:25

## 2021-03-17 RX ADMIN — Medication 1 MILLIGRAM(S): at 07:18

## 2021-03-17 RX ADMIN — Medication 1 MILLIGRAM(S): at 04:36

## 2021-03-17 RX ADMIN — Medication 15 MILLIGRAM(S): at 05:53

## 2021-03-17 NOTE — DISCHARGE NOTE PROVIDER - NSDCCPCAREPLAN_GEN_ALL_CORE_FT
PRINCIPAL DISCHARGE DIAGNOSIS  Diagnosis: Seizures  Assessment and Plan of Treatment: You came to the hospital with witnessed seizure      SECONDARY DISCHARGE DIAGNOSES  Diagnosis: Alcohol abuse  Assessment and Plan of Treatment:      PRINCIPAL DISCHARGE DIAGNOSIS  Diagnosis: Seizures  Assessment and Plan of Treatment: You came to the hospital with witnessed seizure. A seizure is a burst of uncontrolled electrical activity between brain cells (also called neurons or nerve cells) that causes temporary abnormalities in muscle tone or movements (stiffness, twitching or limpness), behaviors, sensations or states of awareness. Your seizure is likely caused by alcohol use, espically when you stop drinking suddenly. You were given Ativan 1mg x5, Keppra 1000mg x 2 (you last receive      SECONDARY DISCHARGE DIAGNOSES  Diagnosis: Alcohol abuse  Assessment and Plan of Treatment:      PRINCIPAL DISCHARGE DIAGNOSIS  Diagnosis: Seizures  Assessment and Plan of Treatment: You came to the hospital with witnessed seizure. A seizure is a burst of uncontrolled electrical activity between brain cells (also called neurons or nerve cells) that causes temporary abnormalities in muscle tone or movements (stiffness, twitching or limpness), behaviors, sensations or states of awareness. Your seizure is likely caused by alcohol use, espically when you stop drinking suddenly. You were given Ativan 1mg x5, Keppra 1000mg x 2 and Librium 25mg -> 50mg this AM. Please make sure to continue librium taper in your detox center. Please continue taking Keppra 1g twice a day.      SECONDARY DISCHARGE DIAGNOSES  Diagnosis: Alcohol abuse  Assessment and Plan of Treatment: You have a history of alcohol abuse. Please follow instructions at your detox center for librium taper and avoid drinking alcohol. Please follow up with your pcp for further management of your mild elevated liver enzymes.

## 2021-03-17 NOTE — DISCHARGE NOTE PROVIDER - NSDCMRMEDTOKEN_GEN_ALL_CORE_FT
Atarax 50 mg oral tablet: 1 tab(s) orally 3 times a day  BuSpar: 15 milligram(s) orally 3 times a day  folic acid 1 mg oral tablet: 1 tab(s) orally once a day  gabapentin 300 mg oral tablet: 1 tab(s) orally 3 times a day  Keppra 1000 mg oral tablet: 1 tab(s) orally 2 times a day  Lexapro 20 mg oral tablet: 1 tab(s) orally once a day  Multiple Vitamins oral tablet: 1 tab(s) orally once a day  Robaxin 500 mg oral tablet:   thiamine 100 mg oral tablet: 1 tab(s) orally once a day  traZODone 100 mg oral tablet: 1 tab(s) orally

## 2021-03-17 NOTE — DISCHARGE NOTE PROVIDER - HOSPITAL COURSE
#Discharge: do not delete    Patient is __ yo M/F with past medical history of _____  Presented with _____, found to have _____    Problem List/Main Diagnoses (system-based):     Inpatient treatment course:     New medications:   Labs to be followed outpatient:   Exam to be followed outpatient:    Hospital course:  Ms. Radha Valente is a 30 year old female with medical history significant for anxiety, depression, alcohol abuse disorder who presented to the ED this evening for EtOH withdrawal complicated by 3 seizures, one of which was witnessed in the ED, admitted for EtOH withdrawal and seizure. Given IVF 3L, Ativan 1mg x5, Keppra 1000x2, Librium 25mg overnight. Patient eager to go back to her detox center, given Librium 50mg. Pt tolerating PO, HD stable for discharge to detox center.     Problem List/Main Diagnoses (system-based):      Problem/Plan - 1:  ·  Problem: Alcohol abuse.  Plan: Pt has significant history of alcohol abuse, has history of seizures, delirium tremens. Has had three tonic clonic seizures, one of which witnessed in ED. Plan for admit to 7L for continuous monitoring on telemetry given high risk of seizure and complications of EtOh withdrawl. Currently CIWA 3.   -s/p 3L NS, Ativan 1mg x5, Librium 25mg and 50mg will continue to taper  -Folic Acid/multivitamin/thiamine   -dc to detox center     Problem/Plan - 2:  ·  Problem: Withdrawal seizures.  Plan: Patient with three tonic clonic seizures today, one of which was witnessed in ED. Likely 2/2 to EtOH withdrawal, though purportedly on Keppra at home for reasons other than Epilepsy. Had seizure workup at OSH in December which was negative. Lactate on admission 5 now 3.    -restarted Keppra 1000mg BID  -Lactate 5->2.6.      Problem/Plan - 3:  ·  Problem: Anxiety and depression.  Plan: Patient has significant history of anxiety and depression for which is seen by outside neuropsychiatrist.   -Takes Atarax, lexapro, Buspar, Trazodone, Gabapentin at home  -Will continue home meds     Problem/Plan - 4:  ·  Problem: Transaminitis.  Plan: Patient presented with AST and ALT elevated at 91 and 95 respectively. Likely secondary to EtoH abuse.   -f/u outpatient  -continue to monitor.     New medications: none    Labs to be followed outpatient: none    Exam to be followed outpatient: none

## 2021-03-17 NOTE — DISCHARGE NOTE NURSING/CASE MANAGEMENT/SOCIAL WORK - PATIENT PORTAL LINK FT
You can access the FollowMyHealth Patient Portal offered by [facility] by registering at the following website: [email or web address]. By joining [employer]’s FollowMyHealth portal, you will also be able to view your health information using other applications (apps) compatible with our system.

## 2021-03-17 NOTE — SBIRT NOTE ADULT - NSSBIRTBRIEFINTDET_GEN_A_CORE
Patient reported recently being discharged from a detox program and relapsed on alcohol. Patient was drinking about 15-25 beers daily. Patient had periods of sobriety. SW offered patient resources; patient declined. Patient stated that she will follow up with outpatient programs on her own.

## 2021-03-24 DIAGNOSIS — F41.8 OTHER SPECIFIED ANXIETY DISORDERS: ICD-10-CM

## 2021-03-24 DIAGNOSIS — F10.139 ALCOHOL ABUSE WITH WITHDRAWAL, UNSPECIFIED: ICD-10-CM

## 2021-03-24 DIAGNOSIS — Z91.013 ALLERGY TO SEAFOOD: ICD-10-CM
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
## 2021-03-24 DIAGNOSIS — E86.0 DEHYDRATION: ICD-10-CM

## 2021-03-24 DIAGNOSIS — R00.0 TACHYCARDIA, UNSPECIFIED: ICD-10-CM

## 2021-03-24 DIAGNOSIS — R74.01 ELEVATION OF LEVELS OF LIVER TRANSAMINASE LEVELS: ICD-10-CM

## 2021-03-24 DIAGNOSIS — G40.509 EPILEPTIC SEIZURES RELATED TO EXTERNAL CAUSES, NOT INTRACTABLE, WITHOUT STATUS EPILEPTICUS: ICD-10-CM

## 2021-09-10 ENCOUNTER — INPATIENT (INPATIENT)
Facility: HOSPITAL | Age: 31
LOS: 2 days | Discharge: LEFT BEFORE DISCHARGE | DRG: 894 | End: 2021-09-13
Attending: HOSPITALIST | Admitting: STUDENT IN AN ORGANIZED HEALTH CARE EDUCATION/TRAINING PROGRAM
Payer: COMMERCIAL

## 2021-09-10 VITALS
RESPIRATION RATE: 18 BRPM | SYSTOLIC BLOOD PRESSURE: 120 MMHG | OXYGEN SATURATION: 97 % | TEMPERATURE: 98 F | DIASTOLIC BLOOD PRESSURE: 82 MMHG | HEART RATE: 92 BPM

## 2021-09-10 LAB
ALBUMIN SERPL ELPH-MCNC: 4.2 G/DL — SIGNIFICANT CHANGE UP (ref 3.3–5.2)
ALP SERPL-CCNC: 93 U/L — SIGNIFICANT CHANGE UP (ref 40–120)
ALT FLD-CCNC: 18 U/L — SIGNIFICANT CHANGE UP
ANION GAP SERPL CALC-SCNC: 14 MMOL/L — SIGNIFICANT CHANGE UP (ref 5–17)
APAP SERPL-MCNC: <3 UG/ML — LOW (ref 10–26)
AST SERPL-CCNC: 32 U/L — HIGH
BILIRUB SERPL-MCNC: 0.3 MG/DL — LOW (ref 0.4–2)
BUN SERPL-MCNC: 4.3 MG/DL — LOW (ref 8–20)
CALCIUM SERPL-MCNC: 8.3 MG/DL — LOW (ref 8.6–10.2)
CHLORIDE SERPL-SCNC: 103 MMOL/L — SIGNIFICANT CHANGE UP (ref 98–107)
CO2 SERPL-SCNC: 23 MMOL/L — SIGNIFICANT CHANGE UP (ref 22–29)
CREAT SERPL-MCNC: 0.71 MG/DL — SIGNIFICANT CHANGE UP (ref 0.5–1.3)
ETHANOL SERPL-MCNC: 319 MG/DL — HIGH (ref 0–9)
GLUCOSE SERPL-MCNC: 99 MG/DL — SIGNIFICANT CHANGE UP (ref 70–99)
HCG SERPL-ACNC: <4 MIU/ML — SIGNIFICANT CHANGE UP
MAGNESIUM SERPL-MCNC: 2.1 MG/DL — SIGNIFICANT CHANGE UP (ref 1.6–2.6)
PHOSPHATE SERPL-MCNC: 1.8 MG/DL — LOW (ref 2.4–4.7)
POTASSIUM SERPL-MCNC: 3.6 MMOL/L — SIGNIFICANT CHANGE UP (ref 3.5–5.3)
POTASSIUM SERPL-SCNC: 3.6 MMOL/L — SIGNIFICANT CHANGE UP (ref 3.5–5.3)
PROT SERPL-MCNC: 7.3 G/DL — SIGNIFICANT CHANGE UP (ref 6.6–8.7)
SALICYLATES SERPL-MCNC: <0.6 MG/DL — LOW (ref 10–20)
SODIUM SERPL-SCNC: 140 MMOL/L — SIGNIFICANT CHANGE UP (ref 135–145)

## 2021-09-10 PROCEDURE — 99053 MED SERV 10PM-8AM 24 HR FAC: CPT

## 2021-09-10 PROCEDURE — 99291 CRITICAL CARE FIRST HOUR: CPT

## 2021-09-10 RX ORDER — THIAMINE MONONITRATE (VIT B1) 100 MG
500 TABLET ORAL EVERY 8 HOURS
Refills: 0 | Status: DISCONTINUED | OUTPATIENT
Start: 2021-09-10 | End: 2021-09-12

## 2021-09-10 RX ORDER — FOLIC ACID 0.8 MG
1 TABLET ORAL DAILY
Refills: 0 | Status: DISCONTINUED | OUTPATIENT
Start: 2021-09-10 | End: 2021-09-13

## 2021-09-10 RX ORDER — MIDAZOLAM HYDROCHLORIDE 1 MG/ML
5 INJECTION, SOLUTION INTRAMUSCULAR; INTRAVENOUS ONCE
Refills: 0 | Status: DISCONTINUED | OUTPATIENT
Start: 2021-09-10 | End: 2021-09-10

## 2021-09-10 RX ORDER — SODIUM CHLORIDE 9 MG/ML
3 INJECTION INTRAMUSCULAR; INTRAVENOUS; SUBCUTANEOUS ONCE
Refills: 0 | Status: COMPLETED | OUTPATIENT
Start: 2021-09-10 | End: 2021-09-10

## 2021-09-10 RX ORDER — LEVETIRACETAM 250 MG/1
1000 TABLET, FILM COATED ORAL ONCE
Refills: 0 | Status: COMPLETED | OUTPATIENT
Start: 2021-09-10 | End: 2021-09-10

## 2021-09-10 RX ORDER — LEVETIRACETAM 250 MG/1
1000 TABLET, FILM COATED ORAL EVERY 12 HOURS
Refills: 0 | Status: DISCONTINUED | OUTPATIENT
Start: 2021-09-11 | End: 2021-09-12

## 2021-09-10 RX ORDER — POTASSIUM PHOSPHATE, MONOBASIC POTASSIUM PHOSPHATE, DIBASIC 236; 224 MG/ML; MG/ML
30 INJECTION, SOLUTION INTRAVENOUS ONCE
Refills: 0 | Status: COMPLETED | OUTPATIENT
Start: 2021-09-10 | End: 2021-09-10

## 2021-09-10 RX ADMIN — MIDAZOLAM HYDROCHLORIDE 5 MILLIGRAM(S): 1 INJECTION, SOLUTION INTRAMUSCULAR; INTRAVENOUS at 23:16

## 2021-09-10 RX ADMIN — SODIUM CHLORIDE 3 MILLILITER(S): 9 INJECTION INTRAMUSCULAR; INTRAVENOUS; SUBCUTANEOUS at 23:17

## 2021-09-10 RX ADMIN — LEVETIRACETAM 400 MILLIGRAM(S): 250 TABLET, FILM COATED ORAL at 23:16

## 2021-09-10 NOTE — ED PROVIDER NOTE - CLINICAL SUMMARY MEDICAL DECISION MAKING FREE TEXT BOX
Will get CT head, labs, and give IV Versed for seizures and consult ICU for admission and reevaluate.

## 2021-09-10 NOTE — CONSULT NOTE ADULT - ASSESSMENT
40 yo female, PMHx alcohol abuse with h/o alcohol withdrawal seizures on Keppra, who presented from detox center with status epilepticus suspected secondary to acute alcohol withdrawal, unknown if patient has an underlying diagnosis of epilepsy for which she was on antiepileptics.      At this time, patient's seizures have been controlled with benzodiazepines and she is protecting her airway, hemodynamically stable. She does not require ICU level of care.  Recommend admission to Step Down Unit  F/U CT head and UTox  Check Keppra level, start Keppra 1000mg IVPB BID   Check prolactin, CPK levels  Give 2L IVF bolus balanced crystalloid  Recommend cvEEG monitoring, Neurology/Epileptology consult  Start high dose standing Ativan taper (orders placed)   with PRN Ativan for breakthrough seizures or as per symptom-triggered CIWA protocol  Hypophosphatemic, ordered for supplementation. Watch electrolytes closely, supplement to keep K >4, Mg >2, Phos >3  Seizure precautions  HOB elevation, aspiration precautions  Fall precautions    Patient seen with Dr. Bo. Please reconsult should clinical condition change.       42 yo female, PMHx alcohol abuse with h/o alcohol withdrawal seizures on Keppra, who presented from detox center with acute alcohol intoxication, status epilepticus suspected secondary to acute alcohol withdrawal despite her significantly elevated serum alcohol level, unknown if patient has an underlying diagnosis of epilepsy for which she was on antiepileptics.      At this time, patient's seizures have been controlled with benzodiazepines and she is protecting her airway, hemodynamically stable. She does not require ICU level of care.  Recommend admission to Step Down Unit  F/U CT head and UTox  Check Keppra level, start Keppra 1000mg IVPB BID   Check prolactin, CPK levels  Give 2L IVF bolus balanced crystalloid  Recommend cvEEG monitoring, Neurology/Epileptology consult  Start high dose standing Ativan taper (orders placed)   with PRN Ativan for breakthrough seizures or as per symptom-triggered CIWA protocol  Hypophosphatemic, ordered for supplementation. Watch electrolytes closely, supplement to keep K >4, Mg >2, Phos >3  Seizure precautions  HOB elevation, aspiration precautions  Fall precautions    Patient seen with Dr. Bo. Please reconsult should clinical condition change.

## 2021-09-10 NOTE — ED ADULT TRIAGE NOTE - CHIEF COMPLAINT QUOTE
BIBEMS from detox center in [place] for reoccurring alcohol withdrawal seizures. Unable to obtain other information. No paperwork sent from detox center. MD called to evaluate.

## 2021-09-10 NOTE — ED PROVIDER NOTE - OBJECTIVE STATEMENT
41 YOF w/ PMHx. of alcohol addiction and withdrawal seizures presents to ED w/ intermittent EtOH withdrawal seizures. PT's last drink was 2 days ago. She was previously at [facility] in [place] for detox but was told she was not in acute detox and was discharged. PT went to [facility] in [place] today and began experiencing withdrawal seizures and was sent to ED. Per EMS PT was having back to back seizures en-route to ED w/ lucid intervals but was able to keep her airway intact. PT has appointment with neurologist and was previously put on Keppra.    Allergy: Codeine

## 2021-09-11 DIAGNOSIS — Z98.890 OTHER SPECIFIED POSTPROCEDURAL STATES: Chronic | ICD-10-CM

## 2021-09-11 DIAGNOSIS — G40.909 EPILEPSY, UNSPECIFIED, NOT INTRACTABLE, WITHOUT STATUS EPILEPTICUS: ICD-10-CM

## 2021-09-11 LAB
ANION GAP SERPL CALC-SCNC: 14 MMOL/L — SIGNIFICANT CHANGE UP (ref 5–17)
BASOPHILS # BLD AUTO: 0.03 K/UL — SIGNIFICANT CHANGE UP (ref 0–0.2)
BASOPHILS NFR BLD AUTO: 0.7 % — SIGNIFICANT CHANGE UP (ref 0–2)
BUN SERPL-MCNC: 5.5 MG/DL — LOW (ref 8–20)
CALCIUM SERPL-MCNC: 7.8 MG/DL — LOW (ref 8.6–10.2)
CHLORIDE SERPL-SCNC: 104 MMOL/L — SIGNIFICANT CHANGE UP (ref 98–107)
CK SERPL-CCNC: 227 U/L — HIGH (ref 25–170)
CO2 SERPL-SCNC: 25 MMOL/L — SIGNIFICANT CHANGE UP (ref 22–29)
CREAT SERPL-MCNC: 0.73 MG/DL — SIGNIFICANT CHANGE UP (ref 0.5–1.3)
EOSINOPHIL # BLD AUTO: 0.05 K/UL — SIGNIFICANT CHANGE UP (ref 0–0.5)
EOSINOPHIL NFR BLD AUTO: 1.1 % — SIGNIFICANT CHANGE UP (ref 0–6)
GLUCOSE SERPL-MCNC: 84 MG/DL — SIGNIFICANT CHANGE UP (ref 70–99)
HCT VFR BLD CALC: 34.9 % — SIGNIFICANT CHANGE UP (ref 34.5–45)
HGB BLD-MCNC: 11.8 G/DL — SIGNIFICANT CHANGE UP (ref 11.5–15.5)
IMM GRANULOCYTES NFR BLD AUTO: 0.2 % — SIGNIFICANT CHANGE UP (ref 0–1.5)
LYMPHOCYTES # BLD AUTO: 2.31 K/UL — SIGNIFICANT CHANGE UP (ref 1–3.3)
LYMPHOCYTES # BLD AUTO: 50.2 % — HIGH (ref 13–44)
MCHC RBC-ENTMCNC: 31.6 PG — SIGNIFICANT CHANGE UP (ref 27–34)
MCHC RBC-ENTMCNC: 33.8 GM/DL — SIGNIFICANT CHANGE UP (ref 32–36)
MCV RBC AUTO: 93.3 FL — SIGNIFICANT CHANGE UP (ref 80–100)
MONOCYTES # BLD AUTO: 0.59 K/UL — SIGNIFICANT CHANGE UP (ref 0–0.9)
MONOCYTES NFR BLD AUTO: 12.8 % — SIGNIFICANT CHANGE UP (ref 2–14)
NEUTROPHILS # BLD AUTO: 1.61 K/UL — LOW (ref 1.8–7.4)
NEUTROPHILS NFR BLD AUTO: 35 % — LOW (ref 43–77)
PHOSPHATE SERPL-MCNC: 4.8 MG/DL — HIGH (ref 2.4–4.7)
PLATELET # BLD AUTO: 388 K/UL — SIGNIFICANT CHANGE UP (ref 150–400)
POTASSIUM SERPL-MCNC: 4.5 MMOL/L — SIGNIFICANT CHANGE UP (ref 3.5–5.3)
POTASSIUM SERPL-SCNC: 4.5 MMOL/L — SIGNIFICANT CHANGE UP (ref 3.5–5.3)
PROLACTIN SERPL-MCNC: 34.6 NG/ML — HIGH (ref 3.4–24.1)
RAPID RVP RESULT: SIGNIFICANT CHANGE UP
RBC # BLD: 3.74 M/UL — LOW (ref 3.8–5.2)
RBC # FLD: 14.5 % — SIGNIFICANT CHANGE UP (ref 10.3–14.5)
SARS-COV-2 RNA SPEC QL NAA+PROBE: SIGNIFICANT CHANGE UP
SODIUM SERPL-SCNC: 143 MMOL/L — SIGNIFICANT CHANGE UP (ref 135–145)
WBC # BLD: 4.6 K/UL — SIGNIFICANT CHANGE UP (ref 3.8–10.5)
WBC # FLD AUTO: 4.6 K/UL — SIGNIFICANT CHANGE UP (ref 3.8–10.5)

## 2021-09-11 PROCEDURE — 93010 ELECTROCARDIOGRAM REPORT: CPT

## 2021-09-11 PROCEDURE — 70450 CT HEAD/BRAIN W/O DYE: CPT | Mod: 26

## 2021-09-11 PROCEDURE — 12345: CPT | Mod: NC

## 2021-09-11 PROCEDURE — 99223 1ST HOSP IP/OBS HIGH 75: CPT

## 2021-09-11 RX ORDER — SODIUM CHLORIDE 9 MG/ML
1000 INJECTION, SOLUTION INTRAVENOUS ONCE
Refills: 0 | Status: COMPLETED | OUTPATIENT
Start: 2021-09-11 | End: 2021-09-11

## 2021-09-11 RX ORDER — INFLUENZA VIRUS VACCINE 15; 15; 15; 15 UG/.5ML; UG/.5ML; UG/.5ML; UG/.5ML
0.5 SUSPENSION INTRAMUSCULAR ONCE
Refills: 0 | Status: DISCONTINUED | OUTPATIENT
Start: 2021-09-11 | End: 2021-09-13

## 2021-09-11 RX ADMIN — SODIUM CHLORIDE 1000 MILLILITER(S): 9 INJECTION, SOLUTION INTRAVENOUS at 01:01

## 2021-09-11 RX ADMIN — Medication 4 MILLIGRAM(S): at 02:25

## 2021-09-11 RX ADMIN — Medication 105 MILLIGRAM(S): at 01:02

## 2021-09-11 RX ADMIN — Medication 1 MILLIGRAM(S): at 12:01

## 2021-09-11 RX ADMIN — Medication 105 MILLIGRAM(S): at 17:46

## 2021-09-11 RX ADMIN — Medication 4 MILLIGRAM(S): at 17:46

## 2021-09-11 RX ADMIN — Medication 1 TABLET(S): at 12:01

## 2021-09-11 RX ADMIN — Medication 3 MILLIGRAM(S): at 22:03

## 2021-09-11 RX ADMIN — Medication 4 MILLIGRAM(S): at 10:29

## 2021-09-11 RX ADMIN — LEVETIRACETAM 400 MILLIGRAM(S): 250 TABLET, FILM COATED ORAL at 23:49

## 2021-09-11 RX ADMIN — POTASSIUM PHOSPHATE, MONOBASIC POTASSIUM PHOSPHATE, DIBASIC 83.33 MILLIMOLE(S): 236; 224 INJECTION, SOLUTION INTRAVENOUS at 01:01

## 2021-09-11 RX ADMIN — LEVETIRACETAM 400 MILLIGRAM(S): 250 TABLET, FILM COATED ORAL at 11:40

## 2021-09-11 RX ADMIN — Medication 4 MILLIGRAM(S): at 14:52

## 2021-09-11 RX ADMIN — Medication 105 MILLIGRAM(S): at 10:34

## 2021-09-11 NOTE — H&P ADULT - NSICDXFAMILYHX_GEN_ALL_CORE_FT
FAMILY HISTORY:  Father  Still living? Unknown  FH: alcoholism, Age at diagnosis: Age Unknown    Sibling  Still living? Unknown  FH: alcoholism, Age at diagnosis: Age Unknown

## 2021-09-11 NOTE — H&P ADULT - ASSESSMENT
40 yo female with Hx active smoker, anxiety, depression, alcohol abuse with hx of withdrawal seizures (has been on keppra in the past), who presented from detox center with witnessed seizures. Multiple seizures at the site, en route with EMS and then witnessed by the ED. Pt was evaluated by the critical care team but not deemed a candidate for the ICU, pt was admitted with acute alcohol intoxication, suspected status epilepticus secondary to acute alcohol withdrawal despite her significantly elevated serum alcohol level, and unknown if patient has an underlying diagnosis of epilepsy.     Admit to Step Down       Acute alcohol intoxication with suspected status epilepticus  -secondary to alcohol abuse intoxication now withdrawal   - alcohol level 300 on admission   - pt reports daily one pint of whiskey  use   - unclear hx of seizure disorder as pt stated she had extensive work up and has been on medications in the past but is currently not on any medications  - f/u prolactin   - ck level 200 range stable   - c/w CIWA protocol   -c/w high dosed ativan taper and ativan prn   - hold for sedation  and reassess in 24 hrs to see if pt needs further titration based on her clinical sx and exam   - c/w keppra po bid for now    -c/w thiamine IV Q8hrs x 9 doses per critical care recs - suspected thiamine deficiency   -c/w MVI and folic acid   -f/u drug screen   -f/u ct head   -Hypophosphatemic, ordered for supplementation. Watch electrolytes closely f/u in the am -  supplement to keep K >4, Mg >2, Phos >3  -c/w Seizure precautions/ HOB elevation, aspiration precautions/ Fall precautions  - epilepsy consulted pt would benefit from vEEG, please follow up with further recs in the am   - ICU consult noted and appreciated   -SW consulted.        Anxiety and depression  - pt currently semi sedated, flat affect  - reports she takes medications but cannot recall them. States she uses a [facility] in [place] but does not know which one.   -When the pt is more alert and awake please obtain further information to complete medication reconciliation     Tobacco dependence  - pt refusing nicotine replacement therapy  - pt counselled on smoking cessation 5 minutes     DVT ppx  -c/w lovenox sq qd    Activity level; OOB to chair with assist    Dispo: Pt remains acute and requires inpt management. Pt reports she lives with her partner at baseline in [place]. Pt would benefit from inpt detox program.       Please complete med reconciliation - pt did not provide medication list  [facility] in [place] but the pt is not stating which street in [place]

## 2021-09-11 NOTE — PROGRESS NOTE ADULT - SUBJECTIVE AND OBJECTIVE BOX
TERRY SIMS  ----------------------------------------  The patient was seen at bedside. Patient with seizures. Offers no complaints. Denied headache or tremors.    Vital Signs Last 24 Hrs  T(C): 36.7 (11 Sep 2021 12:03), Max: 37.1 (11 Sep 2021 04:28)  T(F): 98 (11 Sep 2021 12:03), Max: 98.7 (11 Sep 2021 04:28)  HR: 99 (11 Sep 2021 12:37) (80 - 99)  BP: 106/73 (11 Sep 2021 12:03) (102/50 - 120/82)  BP(mean): 86 (11 Sep 2021 05:15) (86 - 86)  RR: 18 (11 Sep 2021 12:03) (16 - 18)  SpO2: 98% (11 Sep 2021 12:37) (97% - 100%)    PHYSICAL EXAMINATION:  ----------------------------------------  General appearance: No acute distress, Awake, Alert  HEENT: Normocephalic, Atraumatic, Conjunctiva clear, EOMI  Neck: Supple, No JVD, No tenderness  Lungs: Breath sound equal bilaterally, No wheezes, No rales  Cardiovascular: S1S2, Regular rhythm  Abdomen: Soft, Nontender, Nondistended, No guarding/rebound, Positive bowel sounds  Extremities: No clubbing, No cyanosis, No edema, No calf tenderness  Neuro: Strength equal bilaterally, No tremors  Psychiatric: Appropriate mood, Normal affect    LABORATORY STUDIES:  ----------------------------------------             11.8   4.60  )-----------( 388      ( 11 Sep 2021 02:02 )             34.9     09-11    143  |  104  |  5.5<L>  ----------------------------<  84  4.5   |  25.0  |  0.73    Ca    7.8<L>      11 Sep 2021 06:48  Phos  4.8     09-11  Mg     2.1     09-10    TPro  7.3  /  Alb  4.2  /  TBili  0.3<L>  /  DBili  x   /  AST  32<H>  /  ALT  18  /  AlkPhos  93  09-10    LIVER FUNCTIONS - ( 10 Sep 2021 22:46 )  Alb: 4.2 g/dL / Pro: 7.3 g/dL / ALK PHOS: 93 U/L / ALT: 18 U/L / AST: 32 U/L / GGT: x           CARDIAC MARKERS ( 11 Sep 2021 00:16 )  x     / x     / 227 U/L / x     / 1.4 ng/mL    MEDICATIONS  (STANDING):  folic acid Injectable 1 milliGRAM(s) IV Push daily  influenza   Vaccine 0.5 milliLiter(s) IntraMuscular once  levETIRAcetam  IVPB 1000 milliGRAM(s) IV Intermittent every 12 hours  LORazepam   Injectable   IV Push   LORazepam   Injectable 4 milliGRAM(s) IV Push every 4 hours  LORazepam   Injectable 3 milliGRAM(s) IV Push every 4 hours  multivitamin 1 Tablet(s) Oral daily  thiamine IVPB 500 milliGRAM(s) IV Intermittent every 8 hours    MEDICATIONS  (PRN):  LORazepam   Injectable 2 milliGRAM(s) IV Push every 1 hour PRN CIWA-Ar score 7 or less  LORazepam   Injectable 4 milliGRAM(s) IV Push every 1 hour PRN Symptom-triggered: each CIWA -Ar score 8 or GREATER  LORazepam   Injectable 4 milliGRAM(s) IV Push once PRN To control seizure activity      ASSESSMENT / PLAN:  ----------------------------------------  41F with a history of alcohol abuse, anxiety, and depression who presented with seizures.    Seizures - On levetiracetam. Suspect secondary to alcohol abuse/withdrawal. The patient reported a history of withdrawal seizures in the past and noted that she had been previously tested and told that she did not have epilepsy. She reported that she takes levetiracetam for a short period after each hospitalization but is not on the medication routinely. Epilepsy consultation pending.    Alcohol abuse / withdrawal - On a lorazepam taper. Thiamine supplementation for suspected thiamine deficiency. TERRY SIMS  ----------------------------------------  The patient was seen at bedside. Patient with seizures. Offers no complaints. Denied headache or tremors.    Vital Signs Last 24 Hrs  T(C): 36.7 (11 Sep 2021 12:03), Max: 37.1 (11 Sep 2021 04:28)  T(F): 98 (11 Sep 2021 12:03), Max: 98.7 (11 Sep 2021 04:28)  HR: 99 (11 Sep 2021 12:37) (80 - 99)  BP: 106/73 (11 Sep 2021 12:03) (102/50 - 120/82)  BP(mean): 86 (11 Sep 2021 05:15) (86 - 86)  RR: 18 (11 Sep 2021 12:03) (16 - 18)  SpO2: 98% (11 Sep 2021 12:37) (97% - 100%)    PHYSICAL EXAMINATION:  ----------------------------------------  General appearance: No acute distress, Awake, Alert  HEENT: Normocephalic, Atraumatic, Conjunctiva clear, EOMI  Neck: Supple, No JVD, No tenderness  Lungs: Breath sound equal bilaterally, No wheezes, No rales  Cardiovascular: S1S2, Regular rhythm  Abdomen: Soft, Nontender, Nondistended, No guarding/rebound, Positive bowel sounds  Extremities: No clubbing, No cyanosis, No edema, No calf tenderness  Neuro: Strength equal bilaterally, No tremors  Psychiatric: Appropriate mood, Normal affect    LABORATORY STUDIES:  ----------------------------------------             11.8   4.60  )-----------( 388      ( 11 Sep 2021 02:02 )             34.9     09-11    143  |  104  |  5.5<L>  ----------------------------<  84  4.5   |  25.0  |  0.73    Ca    7.8<L>      11 Sep 2021 06:48  Phos  4.8     09-11  Mg     2.1     09-10    TPro  7.3  /  Alb  4.2  /  TBili  0.3<L>  /  DBili  x   /  AST  32<H>  /  ALT  18  /  AlkPhos  93  09-10    LIVER FUNCTIONS - ( 10 Sep 2021 22:46 )  Alb: 4.2 g/dL / Pro: 7.3 g/dL / ALK PHOS: 93 U/L / ALT: 18 U/L / AST: 32 U/L / GGT: x           CARDIAC MARKERS ( 11 Sep 2021 00:16 )  x     / x     / 227 U/L / x     / 1.4 ng/mL    MEDICATIONS  (STANDING):  folic acid Injectable 1 milliGRAM(s) IV Push daily  influenza   Vaccine 0.5 milliLiter(s) IntraMuscular once  levETIRAcetam  IVPB 1000 milliGRAM(s) IV Intermittent every 12 hours  LORazepam   Injectable   IV Push   LORazepam   Injectable 4 milliGRAM(s) IV Push every 4 hours  LORazepam   Injectable 3 milliGRAM(s) IV Push every 4 hours  multivitamin 1 Tablet(s) Oral daily  thiamine IVPB 500 milliGRAM(s) IV Intermittent every 8 hours    MEDICATIONS  (PRN):  LORazepam   Injectable 2 milliGRAM(s) IV Push every 1 hour PRN CIWA-Ar score 7 or less  LORazepam   Injectable 4 milliGRAM(s) IV Push every 1 hour PRN Symptom-triggered: each CIWA -Ar score 8 or GREATER  LORazepam   Injectable 4 milliGRAM(s) IV Push once PRN To control seizure activity      ASSESSMENT / PLAN:  ----------------------------------------  41F with a history of alcohol abuse, anxiety, and depression who presented with seizures.    Seizures - On levetiracetam. CT of the head was without acute intracranial pathology. Suspect secondary to alcohol abuse/withdrawal. The patient reported a history of withdrawal seizures in the past and noted that she had been previously tested and told that she did not have epilepsy. She reported that she takes levetiracetam for a short period after each hospitalization but is not on the medication routinely. Epilepsy consultation pending.    Alcohol abuse / withdrawal - On a lorazepam taper. Thiamine supplementation for suspected thiamine deficiency.

## 2021-09-11 NOTE — H&P ADULT - HISTORY OF PRESENT ILLNESS
42 yo female with Hx active smoker, anxiety, depression, alcohol abuse with hx of withdrawal seizures (has been on keppra in the past), who presented from detox center with witnessed seizures. Patient stated her last drink was 2 days ago, she usually drinks a pint of whiskey a day. She lives in [place] with her partner and was at [facility] in [place] for detox yesterday, but was told she was not in acute withdrawal and was discharged. Patient then went to [facility] in [place] today and began experiencing withdrawal seizures. She was given Valium IM and was sent to ED. Per EMS, patient was having back-to-back seizures en-route to ED with lucid intervals in between and was able to maintain a patent airway. While in the ED, patient had two subsequent seizures, began as focal RUE tremor then became  generalized tonic-clonic seizures. She was given Versed 5mg IVP after each seizure, with subsequent abatement of seizure activity. Minimal additional hx obtained from the pt as pt was intermittently lethargic.  Labs reveal EtOH level 319. Patient denies chest pain, SOB, abd pain, N/V, fever, chills, dysuria or any other complaints. All remainder ROS negative.

## 2021-09-11 NOTE — H&P ADULT - MENTAL STATUS
Intermittently lethargic   Does open her eyes and answer questions then falls asleep again   Difficult to obtain history from the patient

## 2021-09-12 LAB
AMPHET UR-MCNC: NEGATIVE — SIGNIFICANT CHANGE UP
ANION GAP SERPL CALC-SCNC: 15 MMOL/L — SIGNIFICANT CHANGE UP (ref 5–17)
BARBITURATES UR SCN-MCNC: NEGATIVE — SIGNIFICANT CHANGE UP
BASOPHILS # BLD AUTO: 0.04 K/UL — SIGNIFICANT CHANGE UP (ref 0–0.2)
BASOPHILS NFR BLD AUTO: 0.7 % — SIGNIFICANT CHANGE UP (ref 0–2)
BENZODIAZ UR-MCNC: POSITIVE
BUN SERPL-MCNC: 5.8 MG/DL — LOW (ref 8–20)
CALCIUM SERPL-MCNC: 8.5 MG/DL — LOW (ref 8.6–10.2)
CHLORIDE SERPL-SCNC: 102 MMOL/L — SIGNIFICANT CHANGE UP (ref 98–107)
CO2 SERPL-SCNC: 18 MMOL/L — LOW (ref 22–29)
COCAINE METAB.OTHER UR-MCNC: NEGATIVE — SIGNIFICANT CHANGE UP
CREAT SERPL-MCNC: 0.58 MG/DL — SIGNIFICANT CHANGE UP (ref 0.5–1.3)
EOSINOPHIL # BLD AUTO: 0.14 K/UL — SIGNIFICANT CHANGE UP (ref 0–0.5)
EOSINOPHIL NFR BLD AUTO: 2.3 % — SIGNIFICANT CHANGE UP (ref 0–6)
GLUCOSE SERPL-MCNC: 93 MG/DL — SIGNIFICANT CHANGE UP (ref 70–99)
HCT VFR BLD CALC: 35.1 % — SIGNIFICANT CHANGE UP (ref 34.5–45)
HGB BLD-MCNC: 12.1 G/DL — SIGNIFICANT CHANGE UP (ref 11.5–15.5)
IMM GRANULOCYTES NFR BLD AUTO: 0.2 % — SIGNIFICANT CHANGE UP (ref 0–1.5)
LYMPHOCYTES # BLD AUTO: 1.51 K/UL — SIGNIFICANT CHANGE UP (ref 1–3.3)
LYMPHOCYTES # BLD AUTO: 24.6 % — SIGNIFICANT CHANGE UP (ref 13–44)
MCHC RBC-ENTMCNC: 31.9 PG — SIGNIFICANT CHANGE UP (ref 27–34)
MCHC RBC-ENTMCNC: 34.5 GM/DL — SIGNIFICANT CHANGE UP (ref 32–36)
MCV RBC AUTO: 92.6 FL — SIGNIFICANT CHANGE UP (ref 80–100)
METHADONE UR-MCNC: NEGATIVE — SIGNIFICANT CHANGE UP
MONOCYTES # BLD AUTO: 0.53 K/UL — SIGNIFICANT CHANGE UP (ref 0–0.9)
MONOCYTES NFR BLD AUTO: 8.6 % — SIGNIFICANT CHANGE UP (ref 2–14)
NEUTROPHILS # BLD AUTO: 3.9 K/UL — SIGNIFICANT CHANGE UP (ref 1.8–7.4)
NEUTROPHILS NFR BLD AUTO: 63.6 % — SIGNIFICANT CHANGE UP (ref 43–77)
OPIATES UR-MCNC: NEGATIVE — SIGNIFICANT CHANGE UP
PCP SPEC-MCNC: SIGNIFICANT CHANGE UP
PCP UR-MCNC: NEGATIVE — SIGNIFICANT CHANGE UP
PLATELET # BLD AUTO: 231 K/UL — SIGNIFICANT CHANGE UP (ref 150–400)
POTASSIUM SERPL-MCNC: 4.3 MMOL/L — SIGNIFICANT CHANGE UP (ref 3.5–5.3)
POTASSIUM SERPL-SCNC: 4.3 MMOL/L — SIGNIFICANT CHANGE UP (ref 3.5–5.3)
RBC # BLD: 3.79 M/UL — LOW (ref 3.8–5.2)
RBC # FLD: 14.2 % — SIGNIFICANT CHANGE UP (ref 10.3–14.5)
SODIUM SERPL-SCNC: 135 MMOL/L — SIGNIFICANT CHANGE UP (ref 135–145)
THC UR QL: NEGATIVE — SIGNIFICANT CHANGE UP
WBC # BLD: 6.13 K/UL — SIGNIFICANT CHANGE UP (ref 3.8–10.5)
WBC # FLD AUTO: 6.13 K/UL — SIGNIFICANT CHANGE UP (ref 3.8–10.5)

## 2021-09-12 PROCEDURE — 95819 EEG AWAKE AND ASLEEP: CPT | Mod: 26

## 2021-09-12 PROCEDURE — 99223 1ST HOSP IP/OBS HIGH 75: CPT

## 2021-09-12 PROCEDURE — 99232 SBSQ HOSP IP/OBS MODERATE 35: CPT

## 2021-09-12 RX ORDER — THIAMINE MONONITRATE (VIT B1) 100 MG
100 TABLET ORAL DAILY
Refills: 0 | Status: DISCONTINUED | OUTPATIENT
Start: 2021-09-12 | End: 2021-09-13

## 2021-09-12 RX ADMIN — Medication 1 MILLIGRAM(S): at 11:35

## 2021-09-12 RX ADMIN — Medication 2 MILLIGRAM(S): at 22:45

## 2021-09-12 RX ADMIN — Medication 3 MILLIGRAM(S): at 10:07

## 2021-09-12 RX ADMIN — Medication 105 MILLIGRAM(S): at 10:07

## 2021-09-12 RX ADMIN — Medication 1 TABLET(S): at 11:35

## 2021-09-12 RX ADMIN — Medication 3 MILLIGRAM(S): at 05:54

## 2021-09-12 RX ADMIN — LEVETIRACETAM 400 MILLIGRAM(S): 250 TABLET, FILM COATED ORAL at 11:35

## 2021-09-12 RX ADMIN — Medication 3 MILLIGRAM(S): at 15:35

## 2021-09-12 RX ADMIN — Medication 105 MILLIGRAM(S): at 02:01

## 2021-09-12 RX ADMIN — Medication 3 MILLIGRAM(S): at 18:57

## 2021-09-12 NOTE — CONSULT NOTE ADULT - SUBJECTIVE AND OBJECTIVE BOX
[facility]                                     Neurology at [place]                                 Elaine Montanez, & Juvenal                                  [address]                                        [place], [place], [address]                                             [phone]    CC: seizure  HPI:  The patient is a 31y Female who presented with seizure in setting of possible alcohol withdrawal.  She has history of alcohol withdrawal seizures.  She was on her way to rehab when this happened.  She has history of unsuccessful attempts at sobriety. Neurology is asked to assess    PAST MEDICAL & SURGICAL HISTORY:  Seizures    Anxiety and depression    H/O hand surgery        MEDICATIONS  (STANDING):  folic acid Injectable 1 milliGRAM(s) IV Push daily  influenza   Vaccine 0.5 milliLiter(s) IntraMuscular once  LORazepam   Injectable   IV Push   LORazepam   Injectable 3 milliGRAM(s) IV Push every 4 hours  LORazepam   Injectable 2 milliGRAM(s) IV Push every 4 hours  multivitamin 1 Tablet(s) Oral daily  thiamine 100 milliGRAM(s) Oral daily    MEDICATIONS  (PRN):  LORazepam   Injectable 2 milliGRAM(s) IV Push every 1 hour PRN CIWA-Ar score 7 or less  LORazepam   Injectable 4 milliGRAM(s) IV Push every 1 hour PRN Symptom-triggered: each CIWA -Ar score 8 or GREATER  LORazepam   Injectable 4 milliGRAM(s) IV Push once PRN To control seizure activity      Allergies    Allergy Status Unknown    Intolerances        SOCIAL HISTORY:  (+) tob,   (+) alcohol pint whiskey daily  no drugs    FAMILY HISTORY:  FH: alcoholism (Father, Sibling)        ROS: 14 point ROS negative other than what is present in HPI or below    Vital Signs Last 24 Hrs  T(C): 36.9 (12 Sep 2021 12:00), Max: 37.2 (11 Sep 2021 15:49)  T(F): 98.5 (12 Sep 2021 12:00), Max: 99 (11 Sep 2021 15:49)  HR: 100 (12 Sep 2021 12:00) (84 - 105)  BP: 120/78 (12 Sep 2021 12:00) (113/77 - 129/83)  BP(mean): 98 (12 Sep 2021 08:00) (88 - 101)  RR: 16 (12 Sep 2021 12:00) (16 - 20)  SpO2: 98% (12 Sep 2021 12:00) (95% - 100%)      General: NAD    Detailed Neurologic Exam:    Mental status: The patient is awake and alert and has normal attention span.  The patient is fully oriented in 3 spheres. The patient is oriented to current events. The patient is able to name objects, follow commands, repeat sentences.    Cranial nerves: Pupils equal and react symmetrically to light. There is no visual field deficit to confrontation. Extraocular motion is full with no nystagmus. There is no ptosis. Facial sensation is intact. Facial musculature is symmetric. Palate elevates symmetrically. Tongue is midline.    Motor: There is normal bulk and tone.  There is no tremor.  Strength is 5/5 in the right arm and leg.   Strength is 5/5 in the left arm and leg.    Sensation: Intact to light touch and pin in 4 extremities    Reflexes: 2+ throughout and plantar responses are flexor.    Cerebellar: There is no dysmetria on finger to nose testing.    Gait : deferred    LABS:                         12.1   6.13  )-----------( 231      ( 12 Sep 2021 11:26 )             35.1       09-12    135  |  102  |  5.8<L>  ----------------------------<  93  4.3   |  18.0<L>  |  0.58    Ca    8.5<L>      12 Sep 2021 11:26  Phos  4.8     09-11  Mg     2.1     09-10    TPro  7.3  /  Alb  4.2  /  TBili  0.3<L>  /  DBili  x   /  AST  32<H>  /  ALT  18  /  AlkPhos  93  09-10      RADIOLOGY & ADDITIONAL STUDIES (independently reviewed unless otherwise noted):  Head CT 9/11- no acute CVA, mass or blood
Patient is a 41y old  Female who presents with a chief complaint of seizure.      BRIEF HOSPITAL COURSE: 42 yo female, PMHx alcohol abuse with withdrawal seizures on Keppra, who presented from detox center with witnessed seizures. Patient stated her last drink was 2 days ago. She was at [facility] in [place] for detox yesterday, but was told she was not in acute withdrawal and was discharged. Patient then went to [facility] in [place] today and began experiencing withdrawal seizures. She was given Valium IM and was sent to ED. Per EMS, patient was having back-to-back seizures en-route to ED with lucid intervals in between and was able to maintain a patent airway. While in the ED, patient had two subsequent seizures, began as focal RUE tremor then becoming generalized tonic-clonic seizures. She was given Versed 5mg IVP after each seizure, with subsequent abatement of seizure activity. Labs reveal EtOH level 319 and hypophosphatemia.    On evaluation, patient was sleeping, protecting her airway, hemodynamically stable 's.   CT head pending  UTox pending        PAST MEDICAL & SURGICAL HISTORY:  Alcohol abuse  Alcohol withdrawal seizures        SOCIAL HISTORY: Alcohol abuse        Review of Systems: Due to altered mental status, subjective information was not able to be obtained from the patient. History was obtained, to the extent possible, from review of the chart and collateral sources of information.        Medications:  LORazepam   Injectable 2 milliGRAM(s) IV Push every 1 hour PRN  LORazepam   Injectable   IV Push   LORazepam   Injectable 4 milliGRAM(s) IV Push every 1 hour PRN  LORazepam   Injectable 4 milliGRAM(s) IV Push once PRN  LORazepam   Injectable 4 milliGRAM(s) IV Push every 4 hours    folic acid Injectable 1 milliGRAM(s) IV Push daily  potassium phosphate IVPB 30 milliMole(s) IV Intermittent once  thiamine IVPB 500 milliGRAM(s) IV Intermittent every 8 hours                ICU Vital Signs Last 24 Hrs  T(C): --  T(F): --  HR: 92 (10 Sep 2021 22:36) (92 - 92)  BP: --  BP(mean): --  ABP: --  ABP(mean): --  RR: 18 (10 Sep 2021 22:36) (18 - 18)  SpO2: 97% (10 Sep 2021 22:36) (97% - 97%)          I&O's Detail        LABS:    09-10    140  |  103  |  4.3<L>  ----------------------------<  99  3.6   |  23.0  |  0.71    Ca    8.3<L>      10 Sep 2021 22:46  Phos  1.8     09-10  Mg     2.1     09-10    TPro  7.3  /  Alb  4.2  /  TBili  0.3<L>  /  DBili  x   /  AST  32<H>  /  ALT  18  /  AlkPhos  93  09-10          CAPILLARY BLOOD GLUCOSE            CULTURES:            Physical Examination:    General: No acute distress.      HEENT: Small Right parietal scalp hematoma with old-appearing abrasion. Pupils upward gaze, equal, reactive to light. Symmetric.    PULM: Clear to auscultation bilaterally.    CVS: Regular rate and rhythm, no murmurs.    ABD: Soft, nondistended, nontender, normoactive bowel sounds.    EXT: No edema.    SKIN: Warm and well perfused.    NEURO: Sedated        RADIOLOGY: pending        CRITICAL CARE TIME SPENT: 35 minutes spent performing frequent bedside reassessments and augmenting plan of care to address problems of acute critical illness that pose high probability of life threatening deterioration and/or end organ damage/dysfunction and discussing goals of care, non-inclusive of time spent on procedures performed.

## 2021-09-12 NOTE — PROGRESS NOTE ADULT - SUBJECTIVE AND OBJECTIVE BOX
TERRY SIMS  ----------------------------------------  The patient was seen at bedside. Patient with seizure. Offers no complaints. Reported improvement in tremors.    Vital Signs Last 24 Hrs  T(C): 36.9 (12 Sep 2021 12:00), Max: 37.2 (11 Sep 2021 15:49)  T(F): 98.5 (12 Sep 2021 12:00), Max: 99 (11 Sep 2021 15:49)  HR: 100 (12 Sep 2021 12:00) (84 - 105)  BP: 120/78 (12 Sep 2021 12:00) (113/77 - 129/83)  BP(mean): 98 (12 Sep 2021 08:00) (88 - 101)  RR: 16 (12 Sep 2021 12:00) (16 - 20)  SpO2: 98% (12 Sep 2021 12:00) (95% - 100%)    PHYSICAL EXAMINATION:  ----------------------------------------  General appearance: No acute distress, Awake, Alert  HEENT: Normocephalic, Atraumatic, Conjunctiva clear, EOMI  Neck: Supple, No JVD, No tenderness  Lungs: Breath sound equal bilaterally, No wheezes, No rales  Cardiovascular: S1S2, Regular rhythm  Abdomen: Soft, Nontender, Nondistended, No guarding/rebound, Positive bowel sounds  Extremities: No clubbing, No cyanosis, No edema, No calf tenderness  Neuro: Strength equal bilaterally, No tremors  Psychiatric: Appropriate mood, Normal affect    LABORATORY STUDIES:  ----------------------------------------             12.1   6.13  )-----------( 231      ( 12 Sep 2021 11:26 )             35.1     09-12    135  |  102  |  5.8<L>  ----------------------------<  93  4.3   |  18.0<L>  |  0.58    Ca    8.5<L>      12 Sep 2021 11:26  Phos  4.8     09-11  Mg     2.1     09-10    TPro  7.3  /  Alb  4.2  /  TBili  0.3<L>  /  DBili  x   /  AST  32<H>  /  ALT  18  /  AlkPhos  93  09-10    LIVER FUNCTIONS - ( 10 Sep 2021 22:46 )  Alb: 4.2 g/dL / Pro: 7.3 g/dL / ALK PHOS: 93 U/L / ALT: 18 U/L / AST: 32 U/L / GGT: x           CARDIAC MARKERS ( 11 Sep 2021 00:16 )  x     / x     / 227 U/L / x     / 1.4 ng/mL    MEDICATIONS  (STANDING):  folic acid Injectable 1 milliGRAM(s) IV Push daily  influenza   Vaccine 0.5 milliLiter(s) IntraMuscular once  LORazepam   Injectable   IV Push   LORazepam   Injectable 3 milliGRAM(s) IV Push every 4 hours  LORazepam   Injectable 2 milliGRAM(s) IV Push every 4 hours  multivitamin 1 Tablet(s) Oral daily  thiamine 100 milliGRAM(s) Oral daily    MEDICATIONS  (PRN):  LORazepam   Injectable 2 milliGRAM(s) IV Push every 1 hour PRN CIWA-Ar score 7 or less  LORazepam   Injectable 4 milliGRAM(s) IV Push every 1 hour PRN Symptom-triggered: each CIWA -Ar score 8 or GREATER  LORazepam   Injectable 4 milliGRAM(s) IV Push once PRN To control seizure activity      ASSESSMENT / PLAN:  ----------------------------------------  41F with a history of alcohol abuse, anxiety, and depression who presented with seizures.    Seizures - CT of the head was without acute intracranial pathology. Suspect secondary to alcohol abuse/withdrawal. The patient reported a history of withdrawal seizures in the past and noted that she had been previously tested and told that she did not have epilepsy. Discussed with Neurology and thought to be unlikely epilepsy.    Alcohol abuse / withdrawal - On a lorazepam taper. Thiamine supplementation for suspected thiamine deficiency.

## 2021-09-12 NOTE — EEG REPORT - NS EEG TEXT BOX
[facility]   COMPREHENSIVE EPILEPSY CENTER   REPORT OF ROUTINE VIDEO EEG     [facility]: [address] [place] [address] [address], [place] [address], Ph#: [phone]  [facility]: [id number] [place] [address], Ph#: [phone]  [facility]: [address] [place] [address], Ph#: [phone]    Patient Name: TERRY SIMS  Age and : 31y ([birth year])  MRN #: [id number]  Location: [place] [address]  Referring Physician: Kaleb Salinas    Study Date: 21    _____________________________________________________________  TECHNICAL INFORMATION    Placement and Labeling of Electrodes:  The EEG was performed utilizing 20 channels referential EEG connections (coronal over temporal over parasagittal montage) using all standard 10-20 electrode placements with EKG.  Recording was at a sampling rate of 256 samples per second per channel.  Time synchronized digital video recording was done simultaneously with EEG recording.  A low light infrared camera was used for low light recording.  Ángel and seizure detection algorithms were utilized.    _____________________________________________________________  HISTORY    Patient is a 31y old  Female who presents with a chief complaint of seizure like activity (12 Sep 2021 13:54)      PERTINENT MEDICATION:  MEDICATIONS  (STANDING):  folic acid Injectable 1 milliGRAM(s) IV Push daily  influenza   Vaccine 0.5 milliLiter(s) IntraMuscular once  LORazepam   Injectable   IV Push   LORazepam   Injectable 3 milliGRAM(s) IV Push every 4 hours  LORazepam   Injectable 2 milliGRAM(s) IV Push every 4 hours  multivitamin 1 Tablet(s) Oral daily  thiamine 100 milliGRAM(s) Oral daily    _____________________________________________________________  STUDY INTERPRETATION    Findings: The background was continuous and reactive. During wakefulness, the posterior dominant rhythm consisted of symmetric, well-modulated 10 Hz activity, with amplitude to 30 uV, that attenuated to eye opening.      Background Slowing:  No generalized background slowing was present.    Focal Slowing:   None were present.    Sleep Background:  Drowsiness was characterized by fragmentation, attenuation, and slowing of the background activity.    Stage II sleep transients were not recorded.    Other Non-Epileptiform Findings:  None were present.    Interictal Epileptiform Activity:   None were present.    Events:  Clinical events: None recorded.  Seizures: None recorded.    Activation Procedures:   Hyperventilation was not performed.    Photic stimulation was not performed.     Artifacts:  Intermittent myogenic and movement artifacts were noted.    ECG:  The heart rate on single channel ECG was predominantly between  BPM.    _____________________________________________________________  EEG SUMMARY/CLASSIFICATION    Normal EEG in the awake and drowsy states.    _____________________________________________________________  EEG IMPRESSION/CLINICAL CORRELATE    Normal EEG study.  No epileptiform pattern or seizure seen.    *** PRELIMINARY REPORT - NOT FINAL UNTIL EPILEPSY ATTENDING REVIEW/COSIGN ***  _____________________________________________________________    Tony White MD, ANDREW  Fellow | [facility]   COMPREHENSIVE EPILEPSY CENTER   REPORT OF ROUTINE VIDEO EEG     [facility]: [address] [place] [address] [address], [place] [address], Ph#: [phone]  [facility]: [id number] [place] [address], Ph#: [phone]  [facility]: [address] [place] [address], Ph#: [phone]    Patient Name: TERRY SIMS  Age and : 31y ([birth year])  MRN #: [id number]  Location: [place] [address]  Referring Physician: Kaleb Salinas    Study Date: 21    _____________________________________________________________  TECHNICAL INFORMATION    Placement and Labeling of Electrodes:  The EEG was performed utilizing 20 channels referential EEG connections (coronal over temporal over parasagittal montage) using all standard 10-20 electrode placements with EKG.  Recording was at a sampling rate of 256 samples per second per channel.  Time synchronized digital video recording was done simultaneously with EEG recording.  A low light infrared camera was used for low light recording.  Ángel and seizure detection algorithms were utilized.    _____________________________________________________________  HISTORY    Patient is a 31y old  Female who presents with a chief complaint of seizure like activity (12 Sep 2021 13:54)      PERTINENT MEDICATION:  MEDICATIONS  (STANDING):  folic acid Injectable 1 milliGRAM(s) IV Push daily  influenza   Vaccine 0.5 milliLiter(s) IntraMuscular once  LORazepam   Injectable   IV Push   LORazepam   Injectable 3 milliGRAM(s) IV Push every 4 hours  LORazepam   Injectable 2 milliGRAM(s) IV Push every 4 hours  multivitamin 1 Tablet(s) Oral daily  thiamine 100 milliGRAM(s) Oral daily    _____________________________________________________________  STUDY INTERPRETATION    Findings: The background was continuous and reactive. During wakefulness, the posterior dominant rhythm consisted of symmetric, well-modulated 10 Hz activity, with amplitude to 30 uV, that attenuated to eye opening.      Background Slowing:  No generalized background slowing was present.    Focal Slowing:   None were present.    Sleep Background:  Drowsiness was characterized by fragmentation, attenuation, and slowing of the background activity.    Stage II sleep transients were not recorded.    Other Non-Epileptiform Findings:  None were present.    Interictal Epileptiform Activity:   None were present.    Events:  Clinical events: None recorded.  Seizures: None recorded.    Activation Procedures:   Hyperventilation was not performed.    Photic stimulation was not performed.     Artifacts:  Intermittent myogenic and movement artifacts were noted.    ECG:  The heart rate on single channel ECG was predominantly between  BPM.    _____________________________________________________________  EEG SUMMARY/CLASSIFICATION    Normal EEG in the awake and drowsy states.    _____________________________________________________________  EEG IMPRESSION/CLINICAL CORRELATE    Normal EEG study.  No epileptiform pattern or seizure seen.  Tachycardia noted, correlate clinically and with 12-lead ECG.    *** PRELIMINARY REPORT - NOT FINAL UNTIL EPILEPSY ATTENDING REVIEW/COSIGN ***  _____________________________________________________________    Tony White MD, ANDREW  Fellow | [facility] [place]

## 2021-09-12 NOTE — CONSULT NOTE ADULT - ASSESSMENT
The patient is a 31y Female who is followed by neurology because of seizure    seizure  alcohol withdrawal seizure history  however her EtOH 319 on admission,  check EEG, stop keppra for now  she says she has ahd seizure workup in past which was negative    If EEG abnormal suggest restart keppra  Advised and encouraged alcohol and tobacco cessation    will follow with you    Richard Henry MD PhD   [id number]

## 2021-09-13 VITALS
HEART RATE: 77 BPM | DIASTOLIC BLOOD PRESSURE: 74 MMHG | RESPIRATION RATE: 18 BRPM | OXYGEN SATURATION: 99 % | TEMPERATURE: 98 F | SYSTOLIC BLOOD PRESSURE: 117 MMHG

## 2021-09-13 PROCEDURE — 70450 CT HEAD/BRAIN W/O DYE: CPT | Mod: MA

## 2021-09-13 PROCEDURE — 95819 EEG AWAKE AND ASLEEP: CPT

## 2021-09-13 PROCEDURE — 96375 TX/PRO/DX INJ NEW DRUG ADDON: CPT

## 2021-09-13 PROCEDURE — 96374 THER/PROPH/DIAG INJ IV PUSH: CPT

## 2021-09-13 PROCEDURE — 80048 BASIC METABOLIC PNL TOTAL CA: CPT

## 2021-09-13 PROCEDURE — 82550 ASSAY OF CK (CPK): CPT

## 2021-09-13 PROCEDURE — 36415 COLL VENOUS BLD VENIPUNCTURE: CPT

## 2021-09-13 PROCEDURE — 82553 CREATINE MB FRACTION: CPT

## 2021-09-13 PROCEDURE — 93005 ELECTROCARDIOGRAM TRACING: CPT

## 2021-09-13 PROCEDURE — 99285 EMERGENCY DEPT VISIT HI MDM: CPT

## 2021-09-13 PROCEDURE — 85025 COMPLETE CBC W/AUTO DIFF WBC: CPT

## 2021-09-13 PROCEDURE — 0225U NFCT DS DNA&RNA 21 SARSCOV2: CPT

## 2021-09-13 PROCEDURE — 84146 ASSAY OF PROLACTIN: CPT

## 2021-09-13 PROCEDURE — 80177 DRUG SCRN QUAN LEVETIRACETAM: CPT

## 2021-09-13 PROCEDURE — 99232 SBSQ HOSP IP/OBS MODERATE 35: CPT

## 2021-09-13 PROCEDURE — 84100 ASSAY OF PHOSPHORUS: CPT

## 2021-09-13 RX ADMIN — Medication 2 MILLIGRAM(S): at 05:26

## 2021-09-13 NOTE — PROGRESS NOTE ADULT - SUBJECTIVE AND OBJECTIVE BOX
TERRY SIMS  ----------------------------------------  The patient was seen at bedside. Patient with seizures. Offers no complaints.    Vital Signs Last 24 Hrs  T(C): 36.8 (13 Sep 2021 08:00), Max: 36.9 (12 Sep 2021 12:00)  T(F): 98.3 (13 Sep 2021 08:00), Max: 98.5 (12 Sep 2021 12:00)  HR: 77 (13 Sep 2021 08:00) (68 - 100)  BP: 117/74 (13 Sep 2021 08:00) (108/73 - 125/85)  BP(mean): 85 (13 Sep 2021 05:10) (85 - 98)  RR: 18 (13 Sep 2021 08:00) (16 - 18)  SpO2: 99% (13 Sep 2021 08:00) (96% - 99%)    PHYSICAL EXAMINATION:  ----------------------------------------  General appearance: No acute distress, Awake, Alert  HEENT: Normocephalic, Atraumatic, Conjunctiva clear, EOMI  Neck: Supple, No JVD  Lungs: Clear to auscultation, Breath sound equal bilaterally  Cardiovascular: S1S2, Regular rhythm  Abdomen: Soft, Nontender, Positive bowel sounds  Extremities: No clubbing, No cyanosis, No edema    LABORATORY STUDIES:  ----------------------------------------             12.1   6.13  )-----------( 231      ( 12 Sep 2021 11:26 )             35.1     09-12    135  |  102  |  5.8<L>  ----------------------------<  93  4.3   |  18.0<L>  |  0.58    Ca    8.5<L>      12 Sep 2021 11:26    MEDICATIONS  (STANDING):  folic acid Injectable 1 milliGRAM(s) IV Push daily  influenza   Vaccine 0.5 milliLiter(s) IntraMuscular once  LORazepam   Injectable   IV Push   LORazepam   Injectable 2 milliGRAM(s) IV Push every 4 hours  LORazepam   Injectable 1 milliGRAM(s) IV Push every 4 hours  multivitamin 1 Tablet(s) Oral daily  thiamine 100 milliGRAM(s) Oral daily    MEDICATIONS  (PRN):  LORazepam   Injectable 2 milliGRAM(s) IV Push every 1 hour PRN CIWA-Ar score 7 or less  LORazepam   Injectable 4 milliGRAM(s) IV Push every 1 hour PRN Symptom-triggered: each CIWA -Ar score 8 or GREATER  LORazepam   Injectable 4 milliGRAM(s) IV Push once PRN To control seizure activity      ASSESSMENT / PLAN:  ----------------------------------------  41F with a history of alcohol abuse, anxiety, and depression who presented with seizures.    Seizures - CT of the head was without acute intracranial pathology. Suspect secondary to alcohol abuse/withdrawal. The patient reported a history of withdrawal seizures in the past and noted that she had been previously tested and told that she did not have epilepsy. EEG was without epileptiform pattern or seizure.    Alcohol abuse / withdrawal - On a lorazepam taper. Thiamine supplementation for suspected thiamine deficiency. The patient reported that she was planning to follow up with an alcohol detox program.

## 2021-09-13 NOTE — CHART NOTE - NSCHARTNOTEFT_GEN_A_CORE
Patient requesting to sign out AMA. Patient reports "I'll just go to another rehab, I am not staying here".    Patient is alert and oriented x3 and has capacity to make decisions. I explained at length to the patient the risks of signing out AMA including but not limited to harm, injury or death. I explained the risks, benefits and alternatives to treatment as well as the risks of refusing treatment at this time. I offered to answer any questions and fully addressed concerns and answered any such questions. Patient fully understands what has been explained and answered. Dr. Salinas made aware of above. Patient signed form to sign out AMA and she accepts responsibility for any and all results of this decision.

## 2021-09-13 NOTE — DISCHARGE NOTE PROVIDER - HOSPITAL COURSE
31F presented from the rehabilitation facility with seizures and reported that her last drink was two days prior. The patient was at [facility] in [place] for detoxification but discharged as she was not in acute withdrawal, she then went to [facility] in [place] and referred to the hospital. The patient was noted to have seizure like activity en route to the hospital as well as in the emergency department. On presentation, Phos(1.8), AST/ALT(32/18), CPK(227), ETOH(319). CT of the head(9/11) noted no acute intracranial hemorrhage, mass effect, midline shift, extra-axial collection, hydrocephalus, or acute vascular territorial infarction. The patient was seen by ICU and thought not to require admission to the intensive care unit and a lorazepam taper was initiated along with levetiracetam. The patient was seen by Neurology in consultation and the seizures were thought to be secondary to alcohol abuse/withdrawal. EEG(9/12) was without epileptiform pattern or seizures. The patient had improvement in her symptoms and requested to leave the hospital prior to completion of treatment.  The test results and clinical findings were explained to the patient. The plan of care, including the risks, benefits, and alternatives, were discussed with the patient in detail. The patient was informed of the potential complications and risks involved with leaving the hospital prior to completion of treatment. The patient was noted to have capacity to make decisions as well as insight into her current condition and expressed understanding and acceptance of the potential risks. The patient continued to be unwilling to stay in the hospital for further management. The patient was advised to seek immediate medical attention if he was to leave the hospital.

## 2021-09-16 LAB — LEVETIRACETAM SERPL-MCNC: 35.1 UG/ML — SIGNIFICANT CHANGE UP (ref 10–40)

## 2021-10-04 ENCOUNTER — EMERGENCY (EMERGENCY)
Facility: HOSPITAL | Age: 31
LOS: 1 days | Discharge: HOME | End: 2021-10-04
Attending: STUDENT IN AN ORGANIZED HEALTH CARE EDUCATION/TRAINING PROGRAM
Payer: COMMERCIAL

## 2021-10-04 VITALS
WEIGHT: 160.06 LBS | SYSTOLIC BLOOD PRESSURE: 150 MMHG | OXYGEN SATURATION: 98 % | RESPIRATION RATE: 18 BRPM | HEART RATE: 100 BPM | DIASTOLIC BLOOD PRESSURE: 93 MMHG | HEIGHT: 65 IN | TEMPERATURE: 98 F

## 2021-10-04 DIAGNOSIS — Z98.890 OTHER SPECIFIED POSTPROCEDURAL STATES: Chronic | ICD-10-CM

## 2021-10-04 LAB
BASOPHILS # BLD AUTO: 0.04 K/UL — SIGNIFICANT CHANGE UP (ref 0–0.2)
BASOPHILS NFR BLD AUTO: 0.8 % — SIGNIFICANT CHANGE UP (ref 0–2)
EOSINOPHIL # BLD AUTO: 0.06 K/UL — SIGNIFICANT CHANGE UP (ref 0–0.5)
EOSINOPHIL NFR BLD AUTO: 1.1 % — SIGNIFICANT CHANGE UP (ref 0–6)
HCT VFR BLD CALC: 37.1 % — SIGNIFICANT CHANGE UP (ref 34.5–45)
HGB BLD-MCNC: 12.5 G/DL — SIGNIFICANT CHANGE UP (ref 11.5–15.5)
IMM GRANULOCYTES NFR BLD AUTO: 0.2 % — SIGNIFICANT CHANGE UP (ref 0–1.5)
LYMPHOCYTES # BLD AUTO: 3.33 K/UL — HIGH (ref 1–3.3)
LYMPHOCYTES # BLD AUTO: 62.5 % — HIGH (ref 13–44)
MCHC RBC-ENTMCNC: 31.4 PG — SIGNIFICANT CHANGE UP (ref 27–34)
MCHC RBC-ENTMCNC: 33.7 GM/DL — SIGNIFICANT CHANGE UP (ref 32–36)
MCV RBC AUTO: 93.2 FL — SIGNIFICANT CHANGE UP (ref 80–100)
MONOCYTES # BLD AUTO: 0.58 K/UL — SIGNIFICANT CHANGE UP (ref 0–0.9)
MONOCYTES NFR BLD AUTO: 10.9 % — SIGNIFICANT CHANGE UP (ref 2–14)
NEUTROPHILS # BLD AUTO: 1.31 K/UL — LOW (ref 1.8–7.4)
NEUTROPHILS NFR BLD AUTO: 24.5 % — LOW (ref 43–77)
PLATELET # BLD AUTO: 432 K/UL — HIGH (ref 150–400)
RBC # BLD: 3.98 M/UL — SIGNIFICANT CHANGE UP (ref 3.8–5.2)
RBC # FLD: 15.4 % — HIGH (ref 10.3–14.5)
WBC # BLD: 5.33 K/UL — SIGNIFICANT CHANGE UP (ref 3.8–10.5)
WBC # FLD AUTO: 5.33 K/UL — SIGNIFICANT CHANGE UP (ref 3.8–10.5)

## 2021-10-04 PROCEDURE — 36556 INSERT NON-TUNNEL CV CATH: CPT

## 2021-10-04 PROCEDURE — 99284 EMERGENCY DEPT VISIT MOD MDM: CPT | Mod: 25

## 2021-10-04 RX ORDER — LEVETIRACETAM 250 MG/1
1000 TABLET, FILM COATED ORAL
Refills: 0 | Status: DISCONTINUED | OUTPATIENT
Start: 2021-10-04 | End: 2021-10-09

## 2021-10-04 RX ORDER — SODIUM CHLORIDE 9 MG/ML
1000 INJECTION INTRAMUSCULAR; INTRAVENOUS; SUBCUTANEOUS ONCE
Refills: 0 | Status: COMPLETED | OUTPATIENT
Start: 2021-10-04 | End: 2021-10-04

## 2021-10-04 RX ADMIN — Medication 1 MILLIGRAM(S): at 23:30

## 2021-10-04 RX ADMIN — LEVETIRACETAM 1000 MILLIGRAM(S): 250 TABLET, FILM COATED ORAL at 23:23

## 2021-10-04 RX ADMIN — SODIUM CHLORIDE 1000 MILLILITER(S): 9 INJECTION INTRAMUSCULAR; INTRAVENOUS; SUBCUTANEOUS at 23:30

## 2021-10-04 NOTE — ED ADULT TRIAGE NOTE - CHIEF COMPLAINT QUOTE
pt was at [facility] for admission and had seizure. HX of seizures, on medications, did not take today. Admits to drinking alcohol today.

## 2021-10-04 NOTE — ED PROVIDER NOTE - ATTENDING CONTRIBUTION TO CARE
30 yo female presents for evaluation of alcohol abuse and twitching ( possible seizure activity). Patient is awake, alert, oriented with slurred speech and mild confusion. I personally saw the patient with the PA, and completed the key components of the history and physical exam. I then discussed the management plan with the PA.

## 2021-10-04 NOTE — ED PROVIDER NOTE - PHYSICAL EXAMINATION
General: In NAD, non-toxic/well-appearing; well nourished/developed.  Skin: Warm, dry, color normal for race. Multiple areas of ecchymosis to upper extremities.   Head: Normocephalic/atraumatic.   Eyes: Sclera anicteric, conjunctivae clear b/l. PERRLA, EOMI.   Throat: Airway patent. Tolerating secretions, no drooling.   Neck: Supple, FROM.   Cardio: Rate and rhythm regular. No audible murmur, gallop, or rub.  Resp: Speaking in full sentences. Normal AP to lateral diameter, symmetrical excursion b/l. Breath sounds vesicular, symmetrical and without rales, rhonchi or wheezing b/l.  Abd: Non-distended. Soft, non-tender, no masses palpated. No rebound, guarding.   MSK: MAEx4. FROM.   Neuro: A&Ox1 (person). Patient states she knows she is in [place], does not know date. Slurred speech. Clinically intoxicated. Repeated abnormal, irregular movements while being watched. Easily interrupted when addressed and follows commands. No abnormal movements when unaware of observation. General: In no acute respiratory distress. Non-toxic.   Skin: Warm, dry, color normal for race. Multiple areas of ecchymosis to upper extremities.   Head: Normocephalic/atraumatic.   Eyes: Sclera anicteric, conjunctivae clear b/l. PERRLA, EOMI.   Throat: Airway patent. Tolerating secretions, no drooling.   Neck: Supple, FROM.   Cardio: Rate and rhythm regular. No audible murmur, gallop, or rub.  Resp: Speaking in full sentences. Normal AP to lateral diameter, symmetrical excursion b/l. Breath sounds vesicular, symmetrical and without rales, rhonchi or wheezing b/l.  Abd: Non-distended. Soft, non-tender, no masses palpated. No rebound, guarding.   MSK: MAEx4. FROM.   Neuro: A&Ox1 (person). Patient states she knows she is in [place], does not know date. Slurred speech. Clinically intoxicated. Repeated abnormal, irregular movements while being watched. Easily interrupted when addressed and follows commands. No abnormal movements when unaware of observation.

## 2021-10-04 NOTE — ED PROVIDER NOTE - CLINICAL SUMMARY MEDICAL DECISION MAKING FREE TEXT BOX
32 yo female PMHx alcohol abuse, withdrawal seizures presents to ED c/o apparent withdrawal seizure while attempting to present to detox. Patient with irregular abnormal movements while in ED, easily interrupted. CTH negative from 9/2021. Labs unremarkable with exception of alcohol intoxication. Pending SBIRT.
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
## 2021-10-05 VITALS
DIASTOLIC BLOOD PRESSURE: 52 MMHG | HEART RATE: 86 BPM | SYSTOLIC BLOOD PRESSURE: 95 MMHG | RESPIRATION RATE: 18 BRPM | TEMPERATURE: 98 F | OXYGEN SATURATION: 99 %

## 2021-10-05 LAB
ANION GAP SERPL CALC-SCNC: 14 MMOL/L — SIGNIFICANT CHANGE UP (ref 5–17)
BUN SERPL-MCNC: 8.5 MG/DL — SIGNIFICANT CHANGE UP (ref 8–20)
CALCIUM SERPL-MCNC: 8.4 MG/DL — LOW (ref 8.6–10.2)
CHLORIDE SERPL-SCNC: 105 MMOL/L — SIGNIFICANT CHANGE UP (ref 98–107)
CO2 SERPL-SCNC: 25 MMOL/L — SIGNIFICANT CHANGE UP (ref 22–29)
CREAT SERPL-MCNC: 0.84 MG/DL — SIGNIFICANT CHANGE UP (ref 0.5–1.3)
ETHANOL SERPL-MCNC: 350 MG/DL — HIGH (ref 0–9)
GLUCOSE SERPL-MCNC: 112 MG/DL — HIGH (ref 70–99)
HCG SERPL-ACNC: <4 MIU/ML — SIGNIFICANT CHANGE UP
POTASSIUM SERPL-MCNC: 4 MMOL/L — SIGNIFICANT CHANGE UP (ref 3.5–5.3)
POTASSIUM SERPL-SCNC: 4 MMOL/L — SIGNIFICANT CHANGE UP (ref 3.5–5.3)
SARS-COV-2 RNA SPEC QL NAA+PROBE: SIGNIFICANT CHANGE UP
SODIUM SERPL-SCNC: 144 MMOL/L — SIGNIFICANT CHANGE UP (ref 135–145)

## 2021-10-05 PROCEDURE — 36410 VNPNXR 3YR/> PHY/QHP DX/THER: CPT

## 2021-10-05 PROCEDURE — 85025 COMPLETE CBC W/AUTO DIFF WBC: CPT

## 2021-10-05 PROCEDURE — G0378: CPT

## 2021-10-05 PROCEDURE — 99220: CPT | Mod: 25

## 2021-10-05 PROCEDURE — 84702 CHORIONIC GONADOTROPIN TEST: CPT

## 2021-10-05 PROCEDURE — 36415 COLL VENOUS BLD VENIPUNCTURE: CPT

## 2021-10-05 PROCEDURE — 99285 EMERGENCY DEPT VISIT HI MDM: CPT | Mod: 25

## 2021-10-05 PROCEDURE — 82550 ASSAY OF CK (CPK): CPT

## 2021-10-05 PROCEDURE — 87635 SARS-COV-2 COVID-19 AMP PRB: CPT

## 2021-10-05 PROCEDURE — 80048 BASIC METABOLIC PNL TOTAL CA: CPT

## 2021-10-05 PROCEDURE — 80307 DRUG TEST PRSMV CHEM ANLYZR: CPT

## 2021-10-05 RX ADMIN — LEVETIRACETAM 1000 MILLIGRAM(S): 250 TABLET, FILM COATED ORAL at 06:17

## 2021-10-05 NOTE — ED ADULT NURSE REASSESSMENT NOTE - NS ED NURSE REASSESS COMMENT FT1
Assumed care of pt at 05:45 from MANJIT Talbert. Charting as noted. Pt A&Ox3, remains on CM and SPO2. CIWA assessment performed. Report given to [facility] RN where pt was sent from.
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
## 2021-10-05 NOTE — ED CDU PROVIDER INITIAL DAY NOTE - ATTENDING CONTRIBUTION TO CARE
30 yo female history of alcohol abuse and seizure disorder pending sobriety and would like to enter detox programing. I personally saw the patient with the PA, and completed the key components of the history and physical exam. I then discussed the management plan with the PA.

## 2021-10-05 NOTE — ED CDU PROVIDER DISPOSITION NOTE - NSFOLLOWUPINSTRUCTIONS_ED_ALL_ED_FT
- Please proceed directly to [facility].   - Please bring all documentation from your ED visit to any related future follow up appointment.  - Please call to schedule follow up appointment with your primary care physician within 24-48 hours.  - Please seek immediate medical attention or return to the ED for any new/worsening, signs/symptoms, or concerns.      Alcohol Intoxication    WHAT YOU NEED TO KNOW:    What is alcohol intoxication? Alcohol intoxication is a harmful physical condition caused when you drink more alcohol than your body can handle. It is also called ethanol poisoning, or being drunk.    What do I need to know about recommended alcohol limits?   •Men 21 to 64 years should limit alcohol to 2 drinks a day. Do not have more than 4 drinks in 1 day or more than 14 in 1 week.      •All women, and men 65 or older should limit alcohol to 1 drink in a day. Do not have more than 3 drinks in 1 day or more than 7 in 1 week. No amount of alcohol is okay during pregnancy.      What are common signs and symptoms of alcohol intoxication?   •Breath that smells like alcohol      •Blackouts or seizures      •Enlarged pupils, or eye movements that are faster than normal for you      •Fast heartbeat and slow breaths      •Loss of balance, or no ability to walk straight or stand still      •Nausea and vomiting      •Slurred or loud speech      •Quick mood changes      •Trouble at work or school, or risky behavior, such as unprotected sex or driving while intoxicated      How is alcohol intoxication treated? Your healthcare provider will ask about your use of alcohol. These questions may include how much, how often, and what kind of alcohol you drink. He or she may test your memory. Blood or urine samples may be tested for alcohol and for signs of liver, kidney, or heart damage. Treatment may include any of the following:   •Medicines may be given to help you stay calm, control seizures, or prevent nausea and vomiting. You may also be given glucose or vitamin B1 if your levels are too low.      •In brief intervention therapy, a healthcare provider helps you think about your alcohol use differently. He or she helps you set goals to decrease the amount of alcohol you drink. Therapy may continue after you leave the hospital.      •Extra oxygen may be given if you are so intoxicated that you cannot breathe well on your own.      Where can I find more information?   •Alcoholics Anonymous  Web Address: http://www.aa.org      •Substance Abuse and Mental Health Services Administration  [address]  [place] [address]  Web Address: http://www.[facility].gov        Call your local emergency number (911 in the [place]) if:   •You have sudden trouble breathing or chest pain.      •You have a seizure.      •You feel sad enough to harm yourself or others.      When should I call my doctor?   •You have hallucinations (you see or hear things that are not real).      •You cannot stop vomiting.      •You have questions or concerns about your condition or care.      CARE AGREEMENT:    You have the right to help plan your care. Learn about your health condition and how it may be treated. Discuss treatment options with your healthcare providers to decide what care you want to receive. You always have the right to refuse treatment.

## 2021-10-05 NOTE — ED ADULT NURSE REASSESSMENT NOTE - NS ED NURSE REASSESS COMMENT FT1
report given to RN hopps at 5:15. rr even and unlabored. pt moved to A 8L. continued cardiac monitoring in place. rn made aware of transfer.

## 2021-10-05 NOTE — ED CDU PROVIDER INITIAL DAY NOTE - MEDICAL DECISION MAKING DETAILS
30 yo female PMHx alcohol abuse, withdrawal seizures presents to ED c/o apparent withdrawal seizure while attempting to present to detox. Patient with irregular abnormal movements while in ED, easily interrupted. CTH negative from 9/2021. Labs unremarkable with exception of alcohol intoxication. Pending SBIRT.

## 2021-10-05 NOTE — ED CDU PROVIDER DISPOSITION NOTE - CLINICAL COURSE
30 yo female PMHx alcohol abuse, withdrawal seizures presents to ED c/o apparent withdrawal seizure while attempting to present to detox. Patient with irregular abnormal movements while in ED, easily interrupted. CTH negative from 9/2021. Labs unremarkable with exception of alcohol intoxication. Patient medically cleared and accepted back to [facility] program.

## 2021-10-05 NOTE — ED ADULT NURSE REASSESSMENT NOTE - NS ED NURSE REASSESS COMMENT FT1
Endorsed pt from night shift RN. Pt is AOx3, resting comfortably in stretcher. Denies pain or discomfort. No notable distress. Pt awaiting pickup from [facility] facility.

## 2021-10-05 NOTE — ED CDU PROVIDER INITIAL DAY NOTE - PHYSICAL EXAMINATION
General: In NAD, non-toxic/well-appearing; well nourished/developed.  Skin: Warm, dry, color normal for race. Multiple areas of ecchymosis to upper extremities.   Head: Normocephalic/atraumatic.   Eyes: Sclera anicteric, conjunctivae clear b/l. PERRLA, EOMI.   Throat: Airway patent. Tolerating secretions, no drooling.   Neck: Supple, FROM.   Cardio: Rate and rhythm regular. No audible murmur, gallop, or rub.  Resp: Speaking in full sentences. Normal AP to lateral diameter, symmetrical excursion b/l. Breath sounds vesicular, symmetrical and without rales, rhonchi or wheezing b/l.  Abd: Non-distended. Soft, non-tender, no masses palpated. No rebound, guarding.   MSK: MAEx4. FROM.   Neuro: A&Ox1 (person). Patient states she knows she is in [place], does not know date. Slurred speech. Clinically intoxicated. Repeated abnormal, irregular movements while being watched. Easily interrupted when addressed and follows commands. No abnormal movements when unaware of observation. General: General: In no acute respiratory distress. Non-toxic.   Skin: Warm, dry, color normal for race. Multiple areas of ecchymosis to upper extremities.   Head: Normocephalic/atraumatic.   Eyes: Sclera anicteric, conjunctivae clear b/l. PERRLA, EOMI.   Throat: Airway patent. Tolerating secretions, no drooling.   Neck: Supple, FROM.   Cardio: Rate and rhythm regular. No audible murmur, gallop, or rub.  Resp: Speaking in full sentences. Normal AP to lateral diameter, symmetrical excursion b/l. Breath sounds vesicular, symmetrical and without rales, rhonchi or wheezing b/l.  Abd: Non-distended. Soft, non-tender, no masses palpated. No rebound, guarding.   MSK: MAEx4. FROM.   Neuro: A&Ox1 (person). Patient states she knows she is in [place], does not know date. Slurred speech. Clinically intoxicated. Repeated abnormal, irregular movements while being watched. Easily interrupted when addressed and follows commands. No abnormal movements when unaware of observation.

## 2021-10-05 NOTE — ED CDU PROVIDER DISPOSITION NOTE - PATIENT PORTAL LINK FT
You can access the FollowMyHealth Patient Portal offered by [facility] by registering at the following website: [email or web address]. By joining [employer]’s FollowMyHealth portal, you will also be able to view your health information using other applications (apps) compatible with our system.

## 2021-10-05 NOTE — ED CDU PROVIDER INITIAL DAY NOTE - PROGRESS NOTE DETAILS
Spoke with Eric at [facility] in [place]. Patient accepted back to facility as patient medically cleared. Will dispatch ride to ED once discharge papers received.  Phone: [phone]  F: [phone]

## 2021-10-05 NOTE — ED ADULT NURSE REASSESSMENT NOTE - NS ED NURSE REASSESS COMMENT FT1
assumed care of pt at 12:00. pt refusing to answer questions. continues  to rest with eyes closed. responsive to painful stimuli. no apparent distress noted. continued cardiac monitoring in place. assumed care of pt at 12:00.  charting as noted as per prior rn.  report received from paulo mattson. pt refusing to answer questions. continues  to rest with eyes closed. responsive to painful stimuli. no apparent distress noted. continued cardiac monitoring in place.

## 2021-12-21 NOTE — ED CDU PROVIDER DISPOSITION NOTE - NSCDUDISPOSITION_ED_ALL_ED_DT
05-Oct-2021 07:00 Normal Normal Normal Normal Normal Normal Normal Normal Normal Normal Normal Normal

## 2022-03-21 ENCOUNTER — INPATIENT (INPATIENT)
Facility: HOSPITAL | Age: 32
LOS: 0 days | Discharge: OTHER INSTITUTION | DRG: 897 | End: 2022-03-22
Attending: HOSPITALIST | Admitting: STUDENT IN AN ORGANIZED HEALTH CARE EDUCATION/TRAINING PROGRAM
Payer: COMMERCIAL

## 2022-03-21 VITALS
DIASTOLIC BLOOD PRESSURE: 69 MMHG | TEMPERATURE: 98 F | RESPIRATION RATE: 18 BRPM | WEIGHT: 179.9 LBS | SYSTOLIC BLOOD PRESSURE: 139 MMHG | HEART RATE: 105 BPM | OXYGEN SATURATION: 97 % | HEIGHT: 66 IN

## 2022-03-21 DIAGNOSIS — R56.9 UNSPECIFIED CONVULSIONS: ICD-10-CM

## 2022-03-21 DIAGNOSIS — Z98.890 OTHER SPECIFIED POSTPROCEDURAL STATES: Chronic | ICD-10-CM

## 2022-03-21 LAB
ALBUMIN SERPL ELPH-MCNC: 4.5 G/DL — SIGNIFICANT CHANGE UP (ref 3.3–5.2)
ALP SERPL-CCNC: 100 U/L — SIGNIFICANT CHANGE UP (ref 40–120)
ALT FLD-CCNC: 274 U/L — HIGH
ANION GAP SERPL CALC-SCNC: 22 MMOL/L — HIGH (ref 5–17)
APAP SERPL-MCNC: <3 UG/ML — LOW (ref 10–26)
AST SERPL-CCNC: 278 U/L — HIGH
BASOPHILS # BLD AUTO: 0.03 K/UL — SIGNIFICANT CHANGE UP (ref 0–0.2)
BASOPHILS NFR BLD AUTO: 0.6 % — SIGNIFICANT CHANGE UP (ref 0–2)
BILIRUB SERPL-MCNC: 0.6 MG/DL — SIGNIFICANT CHANGE UP (ref 0.4–2)
BUN SERPL-MCNC: 8 MG/DL — SIGNIFICANT CHANGE UP (ref 8–20)
CALCIUM SERPL-MCNC: 9.2 MG/DL — SIGNIFICANT CHANGE UP (ref 8.6–10.2)
CHLORIDE SERPL-SCNC: 92 MMOL/L — LOW (ref 98–107)
CO2 SERPL-SCNC: 24 MMOL/L — SIGNIFICANT CHANGE UP (ref 22–29)
CREAT SERPL-MCNC: 0.78 MG/DL — SIGNIFICANT CHANGE UP (ref 0.5–1.3)
EGFR: 105 ML/MIN/1.73M2 — SIGNIFICANT CHANGE UP
EOSINOPHIL # BLD AUTO: 0.06 K/UL — SIGNIFICANT CHANGE UP (ref 0–0.5)
EOSINOPHIL NFR BLD AUTO: 1.1 % — SIGNIFICANT CHANGE UP (ref 0–6)
GLUCOSE SERPL-MCNC: 101 MG/DL — HIGH (ref 70–99)
HCG SERPL-ACNC: <4 MIU/ML — SIGNIFICANT CHANGE UP
HCT VFR BLD CALC: 36.1 % — SIGNIFICANT CHANGE UP (ref 34.5–45)
HGB BLD-MCNC: 12.4 G/DL — SIGNIFICANT CHANGE UP (ref 11.5–15.5)
IMM GRANULOCYTES NFR BLD AUTO: 0.2 % — SIGNIFICANT CHANGE UP (ref 0–1.5)
LYMPHOCYTES # BLD AUTO: 1.46 K/UL — SIGNIFICANT CHANGE UP (ref 1–3.3)
LYMPHOCYTES # BLD AUTO: 27.8 % — SIGNIFICANT CHANGE UP (ref 13–44)
MCHC RBC-ENTMCNC: 31.5 PG — SIGNIFICANT CHANGE UP (ref 27–34)
MCHC RBC-ENTMCNC: 34.3 GM/DL — SIGNIFICANT CHANGE UP (ref 32–36)
MCV RBC AUTO: 91.6 FL — SIGNIFICANT CHANGE UP (ref 80–100)
MONOCYTES # BLD AUTO: 0.64 K/UL — SIGNIFICANT CHANGE UP (ref 0–0.9)
MONOCYTES NFR BLD AUTO: 12.2 % — SIGNIFICANT CHANGE UP (ref 2–14)
NEUTROPHILS # BLD AUTO: 3.05 K/UL — SIGNIFICANT CHANGE UP (ref 1.8–7.4)
NEUTROPHILS NFR BLD AUTO: 58.1 % — SIGNIFICANT CHANGE UP (ref 43–77)
PLATELET # BLD AUTO: 193 K/UL — SIGNIFICANT CHANGE UP (ref 150–400)
POTASSIUM SERPL-MCNC: 3.2 MMOL/L — LOW (ref 3.5–5.3)
POTASSIUM SERPL-SCNC: 3.2 MMOL/L — LOW (ref 3.5–5.3)
PROT SERPL-MCNC: 7.6 G/DL — SIGNIFICANT CHANGE UP (ref 6.6–8.7)
RBC # BLD: 3.94 M/UL — SIGNIFICANT CHANGE UP (ref 3.8–5.2)
RBC # FLD: 17 % — HIGH (ref 10.3–14.5)
SALICYLATES SERPL-MCNC: <0.6 MG/DL — LOW (ref 10–20)
SARS-COV-2 RNA SPEC QL NAA+PROBE: SIGNIFICANT CHANGE UP
SODIUM SERPL-SCNC: 138 MMOL/L — SIGNIFICANT CHANGE UP (ref 135–145)
TROPONIN T SERPL-MCNC: <0.01 NG/ML — SIGNIFICANT CHANGE UP (ref 0–0.06)
WBC # BLD: 5.25 K/UL — SIGNIFICANT CHANGE UP (ref 3.8–10.5)
WBC # FLD AUTO: 5.25 K/UL — SIGNIFICANT CHANGE UP (ref 3.8–10.5)

## 2022-03-21 PROCEDURE — 99285 EMERGENCY DEPT VISIT HI MDM: CPT

## 2022-03-21 PROCEDURE — 99222 1ST HOSP IP/OBS MODERATE 55: CPT

## 2022-03-21 PROCEDURE — 93010 ELECTROCARDIOGRAM REPORT: CPT

## 2022-03-21 PROCEDURE — 99223 1ST HOSP IP/OBS HIGH 75: CPT

## 2022-03-21 PROCEDURE — 70450 CT HEAD/BRAIN W/O DYE: CPT | Mod: 26,ME

## 2022-03-21 PROCEDURE — G1004: CPT

## 2022-03-21 PROCEDURE — 95720 EEG PHY/QHP EA INCR W/VEEG: CPT

## 2022-03-21 RX ORDER — ESCITALOPRAM OXALATE 10 MG/1
20 TABLET, FILM COATED ORAL DAILY
Refills: 0 | Status: DISCONTINUED | OUTPATIENT
Start: 2022-03-21 | End: 2022-03-22

## 2022-03-21 RX ORDER — POTASSIUM CHLORIDE 20 MEQ
40 PACKET (EA) ORAL ONCE
Refills: 0 | Status: DISCONTINUED | OUTPATIENT
Start: 2022-03-21 | End: 2022-03-21

## 2022-03-21 RX ORDER — DIAZEPAM 5 MG
5 TABLET ORAL ONCE
Refills: 0 | Status: DISCONTINUED | OUTPATIENT
Start: 2022-03-21 | End: 2022-03-21

## 2022-03-21 RX ORDER — SODIUM CHLORIDE 9 MG/ML
1000 INJECTION INTRAMUSCULAR; INTRAVENOUS; SUBCUTANEOUS
Refills: 0 | Status: DISCONTINUED | OUTPATIENT
Start: 2022-03-21 | End: 2022-03-22

## 2022-03-21 RX ORDER — SODIUM CHLORIDE 9 MG/ML
1000 INJECTION INTRAMUSCULAR; INTRAVENOUS; SUBCUTANEOUS ONCE
Refills: 0 | Status: COMPLETED | OUTPATIENT
Start: 2022-03-21 | End: 2022-03-21

## 2022-03-21 RX ORDER — POTASSIUM CHLORIDE 20 MEQ
40 PACKET (EA) ORAL ONCE
Refills: 0 | Status: COMPLETED | OUTPATIENT
Start: 2022-03-21 | End: 2022-03-21

## 2022-03-21 RX ORDER — ENOXAPARIN SODIUM 100 MG/ML
40 INJECTION SUBCUTANEOUS EVERY 24 HOURS
Refills: 0 | Status: DISCONTINUED | OUTPATIENT
Start: 2022-03-21 | End: 2022-03-22

## 2022-03-21 RX ADMIN — SODIUM CHLORIDE 1000 MILLILITER(S): 9 INJECTION INTRAMUSCULAR; INTRAVENOUS; SUBCUTANEOUS at 11:12

## 2022-03-21 RX ADMIN — Medication 50 MILLIGRAM(S): at 13:58

## 2022-03-21 RX ADMIN — Medication 5 MILLIGRAM(S): at 11:11

## 2022-03-21 RX ADMIN — Medication 40 MILLIEQUIVALENT(S): at 11:11

## 2022-03-21 RX ADMIN — SODIUM CHLORIDE 100 MILLILITER(S): 9 INJECTION INTRAMUSCULAR; INTRAVENOUS; SUBCUTANEOUS at 19:07

## 2022-03-21 RX ADMIN — Medication 5 MILLIGRAM(S): at 23:06

## 2022-03-21 RX ADMIN — Medication 1 MILLIGRAM(S): at 20:28

## 2022-03-21 RX ADMIN — Medication 2 MILLIGRAM(S): at 21:41

## 2022-03-21 NOTE — ED PROVIDER NOTE - NS ED ROS FT
Constitutional: no fever, no chills  Head: NC, AT   Eyes: no redness   ENMT: no nasal congestion/drainage, no sore throat   CV: no chest pain, no edema  Resp: no cough, no dyspnea  GI: no abdominal pain, no nausea, no vomiting, no diarrhea  : no dysuria, no hematuria   Skin: no lesions, no rashes   Neuro: no LOC, no headache, no sensory deficits, no weakness, +seizure like activity Constitutional: no fever, no chills  Head: NC, AT   Eyes: no redness   ENMT: no nasal congestion/drainage, no sore throat   CV: no chest pain, no edema  Resp: no cough, no dyspnea  GI: no abdominal pain, no nausea, no vomiting, no diarrhea  : no dysuria, no hematuria   Skin: no lesions, no rashes   Neuro: no LOC, no headache, no sensory deficits, no weakness, +seizure like activity.

## 2022-03-21 NOTE — ED PROVIDER NOTE - PRO INTERPRETER NEED 2
Chief Complaint   Patient presents with   • Penile Irritaion     [facility]     HPI  Nehemias Strong Jr. is a 64 y.o. male that presents for   Chief Complaint   Patient presents with   • Penile Irritaion     [facility]     Patient was seen at  3 days ago and well as 1 week before that. Patient ultimately diagnosed w/ yeast infection. He was given miconazole powder and things seem to now be improving.    Review of Systems   Constitutional: Negative for chills, fever and unexpected weight loss.   Respiratory: Negative for cough and shortness of breath.    Genitourinary: Positive for penile pain.     The following portions of the patient's history were reviewed and updated as appropriate: problem list, past medical history, past surgical history, allergies, current medication    Problem List Tab  Patient thyroidHistory Tab  Immunizations Tab  Medications Tab  Chart Review Tab  Care Everywhere Tab  Synopsis Tab    PE  Vitals:    06/16/21 1557   BP: 140/86   Pulse: 66   Resp: 16   Temp: 97.7 °F (36.5 °C)   SpO2: 98%     Body mass index is 28.63 kg/m².  General: Well nourished, NAD  Head: AT/NC  Eyes: EOMI, anicteric sclera  Resp: CTAB, SCR, BS equal  CV: RRR w/o m/r/g; 2+ pulses  GI: Soft, NT/ND, +BS  : Normal male genitalia.  Circumcised.  No lesions of the glans penis appreciated  MSK: FROM, no deformity, no edema  Skin: Warm, dry, intact  Neuro: Alert and oriented. No focal deficits  Psych: Appropriate mood and affect    Imaging  No Images in the past 120 days found..    Assessment/Plan   Nehemias Strong Jr. is a 64 y.o. male that presents for   Chief Complaint   Patient presents with   • Penile Irritaion     [facility]     Diagnoses and all orders for this visit:    1. Yeast infection (Primary): Appears to have resolved with miconazole powder.  No further changes at this time   -Monitor   -Okay to continue home miconazole powder     No follow-ups on file.  
English

## 2022-03-21 NOTE — CHART NOTE - NSCHARTNOTEFT_GEN_A_CORE
Called by RN after pt getting agitated and requesting meds for withdrawal symptoms.  Pt was admitted for seizures possible from ETOH withdrawal.  Pt was seen in [facility] two days ago for seizures, contributed to ETOH withdrawal.  Pt reports her last drink was yesterday, had a seizure this am.  Pt was ordered librium taper on admission, later d/c, reason not documented, likely for EEG studies.  Epileptology consulted.  Pt agitated, wanting to go AMA.  Will order ativan 1mg IVPx1 for now.  Monitor and escalate prn.      21:45  Called by RN after pt increasingly agitated; has a CIWA score of 15.    RN contacted detox center, spoke to Sveta @ [phone]; confirmed of above.  Considering pt safety and to prevent possible seizures from withdrawal, will order prn Ativan for CIWA >8.  Dr. Caicedo was made aware of above, agrees with Ativan for tonight; if pt continues to be more agitated, will order librium taper.    RN to given stat dose of Ativan 2mg IVP.  Monitor and escalate prn. Called by RN after pt getting agitated and requesting meds for withdrawal symptoms.  Pt was admitted for seizures possible from ETOH withdrawal.  Pt was seen in [facility] two days ago for seizures, contributed to ETOH withdrawal.  Pt reports her last drink was yesterday, had a seizure this am.  Pt was ordered librium taper on admission, later d/c, reason not documented, likely for EEG studies.  Epileptology consulted.  Pt agitated, wanting to go AMA.  Will order ativan 1mg IVPx1 for now.  Monitor and escalate prn.      21:45  Called by RN after pt increasingly agitated; has a CIWA score of 15.    RN contacted detox center, spoke to Sveta @ [phone]; confirmed of above.  Considering pt safety and to prevent possible seizures from withdrawal, will order prn Ativan for CIWA >8.  Dr. Caicedo was made aware of above, agrees with Ativan for tonight; if pt continues to be more agitated, will order librium taper.    RN to given stat dose of Ativan 2mg IVP.    Monitor and escalate prn.

## 2022-03-21 NOTE — ED ADULT NURSE NOTE - NSIMPLEMENTINTERV_GEN_ALL_ED
Implemented All Fall Risk Interventions:  [place] to call system. Call bell, personal items and telephone within reach. Instruct patient to call for assistance. Room bathroom lighting operational. Non-slip footwear when patient is off stretcher. Physically safe environment: no spills, clutter or unnecessary equipment. Stretcher in lowest position, wheels locked, appropriate side rails in place. Provide visual cue, wrist band, yellow gown, etc. Monitor gait and stability. Monitor for mental status changes and reorient to person, place, and time. Review medications for side effects contributing to fall risk. Reinforce activity limits and safety measures with patient and family.

## 2022-03-21 NOTE — ED ADULT TRIAGE NOTE - CHIEF COMPLAINT QUOTE
Pt brought in by ambulance from detox for eval of witnessed seizure like activity for approx 45 seconds. Pt states last drink was last night. Pt states that last seizure was the day before.

## 2022-03-21 NOTE — H&P ADULT - ASSESSMENT
32 y/o F with PMH of alcohol abuse and anxiety admitted for seizure.     Seizure  - likely secondary to alcohol withdrawal   - Telemetry monitoring   - Seizure precautions  - CT head negative  - Epilepsy consulted  - EEG  - Librium taper  - CIWA protocol  - IVF     Hypokalemia  - 3.2  - Repleted  - Monitor BMP    Transaminitis  - Due to alcohol abuse    Anxiety  - Lexapro 20mg daily  - Buspar 15mg daily    DVT ppx  - Lovenox SQ

## 2022-03-21 NOTE — H&P ADULT - HISTORY OF PRESENT ILLNESS
32 y/o F with PMH of alcohol abuse and anxiety presents for seizure from detox center. The patient states that she was trying to detox and she had a seizure episode this morning as well as the past two days. She states she was seen at [facility] two days ago for similar complaints and was treated as an alcohol withdrawal seizure. Patient reports her last drink being yesterday and today had an aura followed by a seizure.

## 2022-03-21 NOTE — ED PROVIDER NOTE - CLINICAL SUMMARY MEDICAL DECISION MAKING FREE TEXT BOX
4o y f pmh of anxiety and etoh abuse presenting for seizure from Detox center. H&P not consistent with withdrawal seizure but not indicative of ulterior cause. Will get head ct, cbc, cmp, ekg, trop, u tox, salicyaltes, serum tylenol. pmh of anxiety and etoh abuse presenting for seizure from Detox center. H&P not consistent with withdrawal seizure but not indicative of ulterior cause. Will get head ct, cbc, cmp, ekg, trop, u tox, salicyaltes, serum tylenol.

## 2022-03-21 NOTE — ED ADULT NURSE REASSESSMENT NOTE - NS ED NURSE REASSESS COMMENT FT1
Patient found on bathroom floor shaking, awake and alert, MD Pineda called to evaluate patient, VSS, will continue to monitor patient, no head injury.

## 2022-03-21 NOTE — H&P ADULT - NSHPPHYSICALEXAM_GEN_ALL_CORE
Vital Signs Last 24 Hrs  T(F): 98.1 (21 Mar 2022 11:39), Max: 98.1 (21 Mar 2022 11:39)  HR: 95 (21 Mar 2022 11:39) (95 - 105)  BP: 137/94 (21 Mar 2022 11:39) (137/94 - 139/69)  RR: 20 (21 Mar 2022 11:39) (18 - 20)  SpO2: 100% (21 Mar 2022 11:39) (97% - 100%)    Physical Exam:  Constitutional: alert and oriented, in no acute distress   Neck: Soft and supple  Respiratory: Clear to auscultation bilaterally, no wheezes or crackles  Cardiovascular: Regular rate and rhythm, no murmurs, gallops, rubs  Gastrointestinal: Soft, non-tender to palpation, +bs  Vascular: 2+ peripheral pulses  Neurological: A/O x 3, no focal neurological deficits  Musculoskeletal: 5/5 strength b/l upper and lower extremities, no lower extremity edema bilaterally  Skin: no rashes  Psych: answers questions appropriately

## 2022-03-21 NOTE — CONSULT NOTE ADULT - SUBJECTIVE AND OBJECTIVE BOX
[facility]                                                                     MD Malorie Brito DO                                              Epilepsy Consult #: [phone] ([phone])                                               Office: [address] [place] [address], [id number]                                                 Phone: [phone]; Fax: [phone]                            ==============================================    EPILEPSY INITIAL CONSULT NOTE      ADMITTING DIAGNOSIS: Convulsions        HPI:  30 y/o F with PMH of alcohol abuse and anxiety presents for seizure from detox center. The patient states that she was trying to detox and she had a seizure episode this morning as well as the past two days. She states she was seen at [facility] two days ago for similar complaints and was treated as an alcohol withdrawal seizure. Patient reports her last drink being yesterday and today had an aura followed by a seizure.    (21 Mar 2022 15:53)      Patient states that [place] her levetiracetam 1000mg BID for alcohol withdrawal seizures. There is inconsistency in patient's story. She reports she had not been able to keep down levetiracetam the last few days due to vomiting from alcohol withdrawal, but she also states her last drink was last night. Drinks a pint of liquor and beers on a daily basis. Brought in by ambulance after a 45s seizure-like activity at detox center. Another shaking event in the ambulance where she was aware the entire time. Another 30s shaking episode in the bathroom in ER witnessed by RN, but patient said it was not a seizure and that she was just tremulous.     On 10/4/21, patient BIBA from [facility] after seizure-like activity. Her last drink was just earlier that morning with 2 pints of whiskey. EtOH level at 350. Admitted to [facility] 9/11 - 9/13/21 for seizure-like activity. Again presented from rehab with seizure-like activity, as well as events in the ambulance and in ER. Last drink 2 days prior. EtOH level at 319. CK slightly increased at 277. Routine EEG normal. Levetiracetam level at 35.1. Patient left AMA.      PMH:  Anxiety and depression    PSH:  H/O hand surgery    MEDICATION:  busPIRone 5 milliGRAM(s) Oral three times a day  enoxaparin Injectable 40 milliGRAM(s) SubCutaneous every 24 hours  escitalopram 20 milliGRAM(s) Oral daily  LORazepam   Injectable 2 milliGRAM(s) IV Push every 1 hour PRN Symptom-triggered: each CIWA -Ar score 8 or GREATER  sodium chloride 0.9%. 1000 milliLiter(s) (100 mL/Hr) IV Continuous <Continuous>    ALLERGIES:  codeine (Hives; Urticaria)    FH:  FH: alcoholism (Father, Sibling)    SH:  EtOH dependence.    ROS:  Neurology as per HPI, otherwise negative for constitutional, skin, eyes, ENT, respiratory, cardiovascular, gastrointestinal, genitourinary, musculoskeletal, psychiatric, hematology/lymphatics, endocrine, allergic/immunologic.    VITALS:  T(C): 36.6 (03-21-22 @ 22:10), Max: 36.7 (03-21-22 @ 08:16)  HR: 103 (03-21-22 @ 22:10) (95 - 105)  BP: 124/85 (03-21-22 @ 22:10) (124/85 - 139/69)  ABP: --  RR: 18 (03-21-22 @ 22:10) (18 - 20)  SpO2: 99% (03-21-22 @ 22:10) (97% - 100%)  CVP(cm H2O): --    GENERAL PHYSICAL EXAM:  GEN: no distress  HEENT: NCAT, OP clear  EYES: sclera white, conjunctiva clear, no nystagmus  NECK: supple  CV: RRR, no murmur     		  PULM: CTAB, no wheezing  ABD: soft, +BS, NT  EXT: peripheral pulse intact, no cyanosis  MSK: muscle tone and strength normal  SKIN: warm, dry    NEUROLOGICAL EXAM:  Mental Status  Orientation: alert and oriented to person, place, time, and situation   Language: clear and fluent    Cranial Nerves  II: full visual fields intact   III, IV, VI: PERRL, EOMI  V, VII: facial sensation and movement intact and symmetric   VIII: hearing intact   IX, X: uvula midline, soft palate elevates normally   XI: BL shoulder shrug intact   XII: tongue midline    Motor  5/5 BUE and BLE                 Tone and bulk are normal in upper and lower limbs  No pronator drift    Sensation  Intact to light touch and pinprick in all 4 EXTs    Reflex  2+ in BL biceps and patella                                    Plantar responses downward bilaterally    Coordination  Normal FTN bilaterally    Gait  Deferred      LABS:                        12.4   5.25  )-----------( 193      ( 21 Mar 2022 09:38 )             36.1     03-21    138  |  92<L>  |  8.0  ----------------------------<  101<H>  3.2<L>   |  24.0  |  0.78    Ca    9.2      21 Mar 2022 09:38    TPro  7.6  /  Alb  4.5  /  TBili  0.6  /  DBili  x   /  AST  278<H>  /  ALT  274<H>  /  AlkPhos  100  03-21    CAPILLARY BLOOD GLUCOSE        LIVER FUNCTIONS - ( 21 Mar 2022 09:38 )  Alb: 4.5 g/dL / Pro: 7.6 g/dL / ALK PHOS: 100 U/L / ALT: 274 U/L / AST: 278 U/L / GGT: x                 OTHER IMAGING AND STUDIES:    < from: CT Head No Cont (03.21.22 @ 09:55) >  Impression:  Unremarkable noncontrast head CT.

## 2022-03-21 NOTE — ED PROVIDER NOTE - OBJECTIVE STATEMENT
4o y f pmh of anxiety and etoh abuse 4o y f pmh of anxiety and etoh abuse presenting for seizure from Detox center. Reports history of complicated withdrawal. Had a shaking event in the ambulance and EMT said pt was aware at that time. Last drink was last night at shortly before 6. Typically drinks a pint of liquor and 12 beers. Reporting anxiety and mild headache. Denying f/c, ab pain, cp, sob, urination, hallucinations. Says has been worked up for seizure disorder in past and was negative. 31 y f pmh of anxiety and etoh abuse presenting for seizure from Detox center. Reports history of complicated withdrawal. Had a shaking event in the ambulance and EMT said pt was aware at that time. Last drink was last night at shortly before 6. Typically drinks a pint of liquor and 12 beers. Reporting anxiety and mild headache. Denying f/c, ab pain, cp, sob, urination, hallucinations. Says has been worked up for seizure disorder in past and was negative.

## 2022-03-21 NOTE — H&P ADULT - NSHPLABSRESULTS_GEN_ALL_CORE
12.4   5.25  )-----------( 193      ( 21 Mar 2022 09:38 )             36.1   03-21    138  |  92<L>  |  8.0  ----------------------------<  101<H>  3.2<L>   |  24.0  |  0.78    Ca    9.2      21 Mar 2022 09:38    TPro  7.6  /  Alb  4.5  /  TBili  0.6  /  DBili  x   /  AST  278<H>  /  ALT  274<H>  /  AlkPhos  100  03-21

## 2022-03-21 NOTE — ED PROVIDER NOTE - PHYSICAL EXAMINATION
General: well appearing, no sweats, anxious  Head: NC, AT  EENT: EOMI, no scleral icterus  Cardiac: RRR, no apparent murmurs, no lower extremity edema  Respiratory: CTABL, no respiratory distress   Abdomen: soft, ND, NT, nonperitonitic  MSK/Vascular: full ROM, soft compartments, warm extremities  Neuro: AAOx3, sensation to light touch intact, tremor consisting of back and forth wrist movement worse on right. fingers not tremulous.   Psych: cooperative General: well appearing, no sweats, anxious  Head: NC, AT  EENT: EOMI, no scleral icterus  Cardiac: RRR, no apparent murmurs, no lower extremity edema  Respiratory: CTABL, no respiratory distress   Abdomen: soft, ND, NT, nonperitonitic  MSK/Vascular: full ROM, soft compartments, warm extremities  Neuro: AAOx3, sensation to light touch intact, tremor consisting of back and forth wrist movement worse on right. fingers not tremulous.   Psych: cooperative.

## 2022-03-21 NOTE — CONSULT NOTE ADULT - ASSESSMENT
This is a 32yo RH female with a history of EtOH abuse and anxiety who presented with detox center with seizure-like activity. Personally reviewed all imagines, labs and other studies.    Impression:  1. Seizure-like activity: Patient claims she has alcohol withdrawal seizures, but it is very atypical to have alcohol withdrawal seizure with high EtOH content in the body (has had these events with EtOH level in 350s). Low suspicion for epilepsy. Psychogenic non-epileptic seizure (PNES)? Home antiseizure medication: levetiracetam 1000mg BID.  2. Anxiety    Recommendation:  - cvEEG  - patient states she hasn't been keeping down levetiracetam the last few days due to vomiting from alcohol withdrawal; do not resume levetiracetam at this time  - avoid benzodiazepine, if possible, while undergoing EEG  - monitor for alcohol withdrawal   - seizure precaution  - medical management and support care per primary team   - follow[place]up [place] neurologist [place]      Thank you for allowing Epilepsy to participate in the care of this patient.   ______________________  Juan Moran MD   Director, Epilepsy/EMU - [facility]   Epilepsy Consult #: [phone] ([phone])

## 2022-03-22 ENCOUNTER — TRANSCRIPTION ENCOUNTER (OUTPATIENT)
Age: 32
End: 2022-03-22

## 2022-03-22 VITALS
RESPIRATION RATE: 18 BRPM | OXYGEN SATURATION: 99 % | TEMPERATURE: 99 F | HEART RATE: 73 BPM | SYSTOLIC BLOOD PRESSURE: 130 MMHG | DIASTOLIC BLOOD PRESSURE: 79 MMHG

## 2022-03-22 LAB
ALBUMIN SERPL ELPH-MCNC: 4.2 G/DL — SIGNIFICANT CHANGE UP (ref 3.3–5.2)
ALP SERPL-CCNC: 94 U/L — SIGNIFICANT CHANGE UP (ref 40–120)
ALT FLD-CCNC: 208 U/L — HIGH
ANION GAP SERPL CALC-SCNC: 18 MMOL/L — HIGH (ref 5–17)
AST SERPL-CCNC: 163 U/L — HIGH
BILIRUB DIRECT SERPL-MCNC: 0.2 MG/DL — SIGNIFICANT CHANGE UP (ref 0–0.3)
BILIRUB INDIRECT FLD-MCNC: 0.6 MG/DL — SIGNIFICANT CHANGE UP (ref 0.2–1)
BILIRUB SERPL-MCNC: 0.8 MG/DL — SIGNIFICANT CHANGE UP (ref 0.4–2)
BUN SERPL-MCNC: 6.1 MG/DL — LOW (ref 8–20)
CALCIUM SERPL-MCNC: 9.3 MG/DL — SIGNIFICANT CHANGE UP (ref 8.6–10.2)
CHLORIDE SERPL-SCNC: 98 MMOL/L — SIGNIFICANT CHANGE UP (ref 98–107)
CO2 SERPL-SCNC: 22 MMOL/L — SIGNIFICANT CHANGE UP (ref 22–29)
CREAT SERPL-MCNC: 0.67 MG/DL — SIGNIFICANT CHANGE UP (ref 0.5–1.3)
EGFR: 121 ML/MIN/1.73M2 — SIGNIFICANT CHANGE UP
GLUCOSE SERPL-MCNC: 69 MG/DL — LOW (ref 70–99)
HCT VFR BLD CALC: 38.5 % — SIGNIFICANT CHANGE UP (ref 34.5–45)
HGB BLD-MCNC: 12.8 G/DL — SIGNIFICANT CHANGE UP (ref 11.5–15.5)
MAGNESIUM SERPL-MCNC: 1.9 MG/DL — SIGNIFICANT CHANGE UP (ref 1.8–2.6)
MCHC RBC-ENTMCNC: 32 PG — SIGNIFICANT CHANGE UP (ref 27–34)
MCHC RBC-ENTMCNC: 33.2 GM/DL — SIGNIFICANT CHANGE UP (ref 32–36)
MCV RBC AUTO: 96.3 FL — SIGNIFICANT CHANGE UP (ref 80–100)
PHOSPHATE SERPL-MCNC: 3.8 MG/DL — SIGNIFICANT CHANGE UP (ref 2.4–4.7)
PLATELET # BLD AUTO: 174 K/UL — SIGNIFICANT CHANGE UP (ref 150–400)
POTASSIUM SERPL-MCNC: 3.9 MMOL/L — SIGNIFICANT CHANGE UP (ref 3.5–5.3)
POTASSIUM SERPL-SCNC: 3.9 MMOL/L — SIGNIFICANT CHANGE UP (ref 3.5–5.3)
PROT SERPL-MCNC: 7.3 G/DL — SIGNIFICANT CHANGE UP (ref 6.6–8.7)
RBC # BLD: 4 M/UL — SIGNIFICANT CHANGE UP (ref 3.8–5.2)
RBC # FLD: 16.6 % — HIGH (ref 10.3–14.5)
SODIUM SERPL-SCNC: 138 MMOL/L — SIGNIFICANT CHANGE UP (ref 135–145)
WBC # BLD: 3.27 K/UL — LOW (ref 3.8–10.5)
WBC # FLD AUTO: 3.27 K/UL — LOW (ref 3.8–10.5)

## 2022-03-22 PROCEDURE — 85025 COMPLETE CBC W/AUTO DIFF WBC: CPT

## 2022-03-22 PROCEDURE — 80053 COMPREHEN METABOLIC PANEL: CPT

## 2022-03-22 PROCEDURE — 99239 HOSP IP/OBS DSCHRG MGMT >30: CPT

## 2022-03-22 PROCEDURE — G1004: CPT

## 2022-03-22 PROCEDURE — 70450 CT HEAD/BRAIN W/O DYE: CPT | Mod: ME

## 2022-03-22 PROCEDURE — 84484 ASSAY OF TROPONIN QUANT: CPT

## 2022-03-22 PROCEDURE — 96374 THER/PROPH/DIAG INJ IV PUSH: CPT

## 2022-03-22 PROCEDURE — 80048 BASIC METABOLIC PNL TOTAL CA: CPT

## 2022-03-22 PROCEDURE — 80307 DRUG TEST PRSMV CHEM ANLYZR: CPT

## 2022-03-22 PROCEDURE — 95700 EEG CONT REC W/VID EEG TECH: CPT

## 2022-03-22 PROCEDURE — 99285 EMERGENCY DEPT VISIT HI MDM: CPT | Mod: 25

## 2022-03-22 PROCEDURE — 84100 ASSAY OF PHOSPHORUS: CPT

## 2022-03-22 PROCEDURE — 96375 TX/PRO/DX INJ NEW DRUG ADDON: CPT

## 2022-03-22 PROCEDURE — 93005 ELECTROCARDIOGRAM TRACING: CPT

## 2022-03-22 PROCEDURE — U0005: CPT

## 2022-03-22 PROCEDURE — 84702 CHORIONIC GONADOTROPIN TEST: CPT

## 2022-03-22 PROCEDURE — U0003: CPT

## 2022-03-22 PROCEDURE — 80076 HEPATIC FUNCTION PANEL: CPT

## 2022-03-22 PROCEDURE — 95714 VEEG EA 12-26 HR UNMNTR: CPT

## 2022-03-22 PROCEDURE — 83735 ASSAY OF MAGNESIUM: CPT

## 2022-03-22 PROCEDURE — 85027 COMPLETE CBC AUTOMATED: CPT

## 2022-03-22 PROCEDURE — 36415 COLL VENOUS BLD VENIPUNCTURE: CPT

## 2022-03-22 RX ORDER — ESCITALOPRAM OXALATE 10 MG/1
1 TABLET, FILM COATED ORAL
Qty: 0 | Refills: 0 | DISCHARGE
Start: 2022-03-22

## 2022-03-22 RX ORDER — INFLUENZA VIRUS VACCINE 15; 15; 15; 15 UG/.5ML; UG/.5ML; UG/.5ML; UG/.5ML
0.5 SUSPENSION INTRAMUSCULAR ONCE
Refills: 0 | Status: DISCONTINUED | OUTPATIENT
Start: 2022-03-22 | End: 2022-03-22

## 2022-03-22 RX ADMIN — Medication 2 MILLIGRAM(S): at 09:12

## 2022-03-22 RX ADMIN — Medication 5 MILLIGRAM(S): at 09:11

## 2022-03-22 RX ADMIN — Medication 2 MILLIGRAM(S): at 00:09

## 2022-03-22 NOTE — DISCHARGE NOTE PROVIDER - NSDCMRMEDTOKEN_GEN_ALL_CORE_FT
busPIRone 5 mg oral tablet: 1 tab(s) orally 3 times a day  escitalopram 20 mg oral tablet: 1 tab(s) orally once a day  Keppra 1000 mg oral tablet: 1 tab(s) orally 2 times a day

## 2022-03-22 NOTE — DISCHARGE NOTE NURSING/CASE MANAGEMENT/SOCIAL WORK - NSDCPEFALRISK_GEN_ALL_CORE
For information on Fall & Injury Prevention, visit: [email or web address] OR  [email or web address] OR  https://www.cdc.gov/steadi/patient.html

## 2022-03-22 NOTE — DISCHARGE NOTE PROVIDER - ATTENDING DISCHARGE PHYSICAL EXAMINATION:
Vital Signs Last 24 Hrs  T(C): 36.3 (22 Mar 2022 06:06), Max: 36.7 (21 Mar 2022 11:39)  T(F): 97.3 (22 Mar 2022 06:06), Max: 98.1 (21 Mar 2022 11:39)  HR: 90 (22 Mar 2022 06:06) (90 - 103)  BP: 125/87 (22 Mar 2022 06:06) (124/85 - 137/94)  RR: 20 (22 Mar 2022 06:06) (18 - 20)  SpO2: 100% (22 Mar 2022 06:06) (99% - 100%)    PHYSICAL EXAM:  Constitutional: No acute distress, alert and oriented by 3  HEENT: AT/NC, EOMI, PERRLA, Normal conjunctiva, no pharyngeal erythema, moist oral mucosa  Respiratory: CTA BL, equal breath sounds, no crackles or wheezing  Cardiovascular: RRR, no edema  Gastrointestinal: soft, Non-tender, Non-distended + Bowel sounds, no rebound or guarding  Musculoskeletal: No joint edema  Neurological: CN 2-12 grossly intact, no focal deficits  Skin: warm, dry and intact  Psychiatric: normal mood and affect

## 2022-03-22 NOTE — PATIENT PROFILE ADULT - FALL HARM RISK - RISK INTERVENTIONS
Assistance OOB with selected safe patient handling equipment/Assistance with ambulation/Communicate Fall Risk and Risk Factors to all staff, patient, and family/Monitor for mental status changes/Monitor gait and stability/Reinforce activity limits and safety measures with patient and family/Toileting schedule using arm’s reach rule for commode and bathroom/Use of alarms - bed, chair and/or voice tab/Visual Cue: Yellow wristband/Bed in lowest position, wheels locked, appropriate side rails in place/Call bell, personal items and telephone in reach/Instruct patient to call for assistance before getting out of bed or chair/Non-slip footwear when patient is out of bed/[facility] to call system/Physically safe environment - no spills, clutter or unnecessary equipment/Purposeful Proactive Rounding/Room/bathroom lighting operational, light cord in reach

## 2022-03-22 NOTE — EEG REPORT - NS EEG TEXT BOX
[facility]  Epilepsy Monitoring Unit Report    [facility]: [address], [place] [address], Phone [phone]  [facility]: [address] [place], [place] [address] [address], Phone [phone]  [place] Office: [address], [address], [place] [address] Phone [phone]    [facility]: [address] [place] [address], Phone [phone]  [place] Office: [address] [place] [address], Phone [phone]    Patient Name: Radha Valente    Age: 31 year, : 1990  Patient ID: -, MRN #: -, Garcia: -    Physician Ordering Inpatient EEG: Juan Moran  Referral Source to EMU: non-elective admission – from ER     EMU Study Started: 15:50 on 22    EMU Study Ended: pending discharge    Study Information:    EEG Recording Technique:  The patient underwent continuous Video-EEG monitoring, using Telemetry System hardware on the XLTek Digital System. EEG and video data were stored on a computer hard drive with important events saved in digital archive files. The material was reviewed by a physician (electroencephalographer / epileptologist) on a daily basis. Ángel and seizure detection algorithms were utilized and reviewed. An EEG Technician attended to the patient, and was available throughout daytime work hours.  The epilepsy center neurologist was available in person or on call 24-hours per day.    EEG Placement and Labeling of Electrodes:  The EEG was performed utilizing 20 channel referential EEG connections (coronal over temporal over parasagittal montage) using all standard 10-20 electrode placements with EKG, with additional electrodes placed in the inferior temporal region using the modified 10-10 montage electrode placements for elective admissions, or if deemed necessary. Recording was at a sampling rate of 256 samples per second per channel. Time synchronized digital video recording was done simultaneously with EEG recording. A low light infrared camera was used for low light recording.         History:  This is a 30yo RH female with a history of EtOH abuse and anxiety who presented with detox center with seizure-like activity.    Patient states that she follows with [place]alcohol withdrawal seizure [place], who prescribed her levetiracetam 1000mg BID for alcohol withdrawal seizures. There is inconsistency in patient's story. She reports she had not been able to keep down levetiracetam the last few days due to vomiting from alcohol withdrawal, but she also states her last drink was last night. Drinks a pint of liquor and beers on a daily basis. Brought in by ambulance after a 45s seizure-like activity at detox center. Another shaking event in the ambulance where she was aware the entire time. Another 30s shaking episode in the bathroom in ER witnessed by RN, but patient said it was not a seizure and that she was just tremulous.     On 10/4/21, patient BIBA from [facility] after seizure-like activity. Her last drink was just earlier that morning with 2 pints of whiskey. EtOH level at 350. Admitted to [facility]  - 21 for seizure-like activity. Again presented from rehab with seizure-like activity, as well as events in the ambulance and in ER. Last drink 2 days prior. EtOH level at 319. CK slightly increased at 277. Routine EEG normal. Levetiracetam level at 35.1. Patient left AMA.    Home Antiepileptic Medication and Device  LEV 1000mg BID for alcohol withdrawal seizures    Interpretation:    Start Date: 3/21/2022 – Day 1                                Start Time – 15:50       Duration – 16h 07m    Daily EEG Visual Analysis  Findings: The background was continuous and reactive. During wakefulness, the posterior dominant rhythm consisted of symmetric, well-modulated 9 Hz activity, with amplitude to 30 uV, that attenuated to eye opening.     Background Slowing:  No generalized background slowing was present.    Focal Slowing:   None were present.    Sleep Background:  Drowsiness was characterized by fragmentation, attenuation, and slowing of the background activity.    Sleep was characterized by the presence of vertex waves, symmetric sleep spindles and K-complexes.    Other Non-Epileptiform Findings:  Diffuse excess beta activity.    Interictal Epileptiform Activity:   None were present.    Events:  No events or seizures recorded.    Artifacts:  Intermittent myogenic and movement artifacts were noted.    ECG:  The heart rate on single channel ECG was predominantly between 100-120 BPM.    ASM:  [facility]    EEG Summary:  Normal EEG in the awake, drowsy and asleep states.  - Diffuse excess beta activity.    Impression/Clinical Correlate:  3/21 – 3/22: no event or seizure    No event or seizure recorded. Otherwise normal EEG, except for diffuse excess beta activity, which may be seen with medication use such as benzodiazepines or barbiturates.    ________________________________________    Juan Moran MD  Director, Epilepsy/[facility]

## 2022-03-22 NOTE — DISCHARGE NOTE PROVIDER - NSDCCPCAREPLAN_GEN_ALL_CORE_FT
PRINCIPAL DISCHARGE DIAGNOSIS  Diagnosis: Seizure  Assessment and Plan of Treatment: continue Keppra 1000mg Twice a day      SECONDARY DISCHARGE DIAGNOSES  Diagnosis: Alcohol abuse  Assessment and Plan of Treatment: Discharge to det center,

## 2022-03-22 NOTE — DISCHARGE NOTE PROVIDER - HOSPITAL COURSE
30 y/o F with PMH of alcohol abuse and anxiety presents for seizure from detox center. The patient states that she was trying to detox and she had a seizure episode. She states she was seen at [facility] two days ago for similar complaints and was treated as an alcohol withdrawal seizure. patient was brought to [facility] for evaluation. SHe was started on CIWA protocol and Epilepsy was consulted. She was placed on vEEG monitoring. She did not have any seizure episodes. She is restarted on Keppra and is to follow up with her Neurologist. She is discharged back to Detox center to continue her detox from alcohol.

## 2022-03-22 NOTE — SBIRT NOTE ADULT - NSSBIRTBRIEFINTDET_GEN_A_CORE
SW met with patient at bedside to complete AUDIT. SW introduced self, explained role and reason for visit. Patient presented as a&ox4 and receptive to SW intervention. SW completed AUDIT screening. Full screen positive. Screening results were reviewed and patient was provided information about healthy guidelines and potential negative consequences associated with level of risk. Motivation and readiness to reduce or stop use was discussed and goals and activities to make changes were suggested/offered. Patient was admitted from [facility]. Plan is for patient to return to detox once medically optimized. SW will continue to follow to coordinate return.

## 2022-03-22 NOTE — DISCHARGE NOTE NURSING/CASE MANAGEMENT/SOCIAL WORK - PATIENT PORTAL LINK FT
You can access the FollowMyHealth Patient Portal offered by [facility] by registering at the following website: [email or web address]. By joining [employer]’s FollowMyHealth portal, you will also be able to view your health information using other applications (apps) compatible with our system.

## 2022-03-25 ENCOUNTER — EMERGENCY (EMERGENCY)
Facility: HOSPITAL | Age: 32
LOS: 1 days | Discharge: HOME | End: 2022-03-25
Attending: STUDENT IN AN ORGANIZED HEALTH CARE EDUCATION/TRAINING PROGRAM
Payer: COMMERCIAL

## 2022-03-25 VITALS
TEMPERATURE: 98 F | RESPIRATION RATE: 18 BRPM | HEIGHT: 66 IN | WEIGHT: 179.9 LBS | DIASTOLIC BLOOD PRESSURE: 75 MMHG | OXYGEN SATURATION: 100 % | HEART RATE: 87 BPM | SYSTOLIC BLOOD PRESSURE: 100 MMHG

## 2022-03-25 VITALS
OXYGEN SATURATION: 99 % | RESPIRATION RATE: 18 BRPM | HEART RATE: 67 BPM | SYSTOLIC BLOOD PRESSURE: 129 MMHG | TEMPERATURE: 98 F | DIASTOLIC BLOOD PRESSURE: 83 MMHG

## 2022-03-25 DIAGNOSIS — Z98.890 OTHER SPECIFIED POSTPROCEDURAL STATES: Chronic | ICD-10-CM

## 2022-03-25 LAB
ALBUMIN SERPL ELPH-MCNC: 4 G/DL — SIGNIFICANT CHANGE UP (ref 3.3–5.2)
ALP SERPL-CCNC: 89 U/L — SIGNIFICANT CHANGE UP (ref 40–120)
ALT FLD-CCNC: 235 U/L — HIGH
AMPHET UR-MCNC: NEGATIVE — SIGNIFICANT CHANGE UP
ANION GAP SERPL CALC-SCNC: 11 MMOL/L — SIGNIFICANT CHANGE UP (ref 5–17)
APAP SERPL-MCNC: <3 UG/ML — LOW (ref 10–26)
APPEARANCE UR: CLEAR — SIGNIFICANT CHANGE UP
AST SERPL-CCNC: 216 U/L — HIGH
BARBITURATES UR SCN-MCNC: POSITIVE
BASOPHILS # BLD AUTO: 0.02 K/UL — SIGNIFICANT CHANGE UP (ref 0–0.2)
BASOPHILS NFR BLD AUTO: 0.4 % — SIGNIFICANT CHANGE UP (ref 0–2)
BENZODIAZ UR-MCNC: POSITIVE
BILIRUB SERPL-MCNC: 0.3 MG/DL — LOW (ref 0.4–2)
BILIRUB UR-MCNC: NEGATIVE — SIGNIFICANT CHANGE UP
BUN SERPL-MCNC: 3.5 MG/DL — LOW (ref 8–20)
CALCIUM SERPL-MCNC: 8.6 MG/DL — SIGNIFICANT CHANGE UP (ref 8.6–10.2)
CHLORIDE SERPL-SCNC: 102 MMOL/L — SIGNIFICANT CHANGE UP (ref 98–107)
CK MB CFR SERPL CALC: 1.4 NG/ML — SIGNIFICANT CHANGE UP (ref 0–6.7)
CK SERPL-CCNC: 233 U/L — HIGH (ref 25–170)
CO2 SERPL-SCNC: 26 MMOL/L — SIGNIFICANT CHANGE UP (ref 22–29)
COCAINE METAB.OTHER UR-MCNC: NEGATIVE — SIGNIFICANT CHANGE UP
COLOR SPEC: YELLOW — SIGNIFICANT CHANGE UP
CREAT SERPL-MCNC: 0.81 MG/DL — SIGNIFICANT CHANGE UP (ref 0.5–1.3)
DIFF PNL FLD: ABNORMAL
EGFR: 100 ML/MIN/1.73M2 — SIGNIFICANT CHANGE UP
EOSINOPHIL # BLD AUTO: 0.13 K/UL — SIGNIFICANT CHANGE UP (ref 0–0.5)
EOSINOPHIL NFR BLD AUTO: 2.5 % — SIGNIFICANT CHANGE UP (ref 0–6)
ETHANOL SERPL-MCNC: <10 MG/DL — SIGNIFICANT CHANGE UP (ref 0–9)
GLUCOSE SERPL-MCNC: 108 MG/DL — HIGH (ref 70–99)
GLUCOSE UR QL: NEGATIVE MG/DL — SIGNIFICANT CHANGE UP
HCG SERPL-ACNC: <4 MIU/ML — SIGNIFICANT CHANGE UP
HCT VFR BLD CALC: 35.9 % — SIGNIFICANT CHANGE UP (ref 34.5–45)
HGB BLD-MCNC: 12 G/DL — SIGNIFICANT CHANGE UP (ref 11.5–15.5)
IMM GRANULOCYTES NFR BLD AUTO: 0.4 % — SIGNIFICANT CHANGE UP (ref 0–1.5)
KETONES UR-MCNC: NEGATIVE — SIGNIFICANT CHANGE UP
LEUKOCYTE ESTERASE UR-ACNC: NEGATIVE — SIGNIFICANT CHANGE UP
LYMPHOCYTES # BLD AUTO: 1.62 K/UL — SIGNIFICANT CHANGE UP (ref 1–3.3)
LYMPHOCYTES # BLD AUTO: 30.9 % — SIGNIFICANT CHANGE UP (ref 13–44)
MAGNESIUM SERPL-MCNC: 1.4 MG/DL — LOW (ref 1.6–2.6)
MCHC RBC-ENTMCNC: 31.8 PG — SIGNIFICANT CHANGE UP (ref 27–34)
MCHC RBC-ENTMCNC: 33.4 GM/DL — SIGNIFICANT CHANGE UP (ref 32–36)
MCV RBC AUTO: 95.2 FL — SIGNIFICANT CHANGE UP (ref 80–100)
METHADONE UR-MCNC: NEGATIVE — SIGNIFICANT CHANGE UP
MONOCYTES # BLD AUTO: 0.48 K/UL — SIGNIFICANT CHANGE UP (ref 0–0.9)
MONOCYTES NFR BLD AUTO: 9.1 % — SIGNIFICANT CHANGE UP (ref 2–14)
NEUTROPHILS # BLD AUTO: 2.98 K/UL — SIGNIFICANT CHANGE UP (ref 1.8–7.4)
NEUTROPHILS NFR BLD AUTO: 56.7 % — SIGNIFICANT CHANGE UP (ref 43–77)
NITRITE UR-MCNC: NEGATIVE — SIGNIFICANT CHANGE UP
OPIATES UR-MCNC: NEGATIVE — SIGNIFICANT CHANGE UP
PCP SPEC-MCNC: SIGNIFICANT CHANGE UP
PCP UR-MCNC: NEGATIVE — SIGNIFICANT CHANGE UP
PH UR: 7 — SIGNIFICANT CHANGE UP (ref 5–8)
PHOSPHATE SERPL-MCNC: 2.2 MG/DL — LOW (ref 2.4–4.7)
PLATELET # BLD AUTO: 168 K/UL — SIGNIFICANT CHANGE UP (ref 150–400)
POTASSIUM SERPL-MCNC: 4 MMOL/L — SIGNIFICANT CHANGE UP (ref 3.5–5.3)
POTASSIUM SERPL-SCNC: 4 MMOL/L — SIGNIFICANT CHANGE UP (ref 3.5–5.3)
PROT SERPL-MCNC: 6.9 G/DL — SIGNIFICANT CHANGE UP (ref 6.6–8.7)
PROT UR-MCNC: 15
RBC # BLD: 3.77 M/UL — LOW (ref 3.8–5.2)
RBC # FLD: 16 % — HIGH (ref 10.3–14.5)
SALICYLATES SERPL-MCNC: <0.6 MG/DL — LOW (ref 10–20)
SARS-COV-2 RNA SPEC QL NAA+PROBE: SIGNIFICANT CHANGE UP
SODIUM SERPL-SCNC: 139 MMOL/L — SIGNIFICANT CHANGE UP (ref 135–145)
SP GR SPEC: 1 — LOW (ref 1.01–1.02)
THC UR QL: NEGATIVE — SIGNIFICANT CHANGE UP
UROBILINOGEN FLD QL: NEGATIVE MG/DL — SIGNIFICANT CHANGE UP
WBC # BLD: 5.25 K/UL — SIGNIFICANT CHANGE UP (ref 3.8–10.5)
WBC # FLD AUTO: 5.25 K/UL — SIGNIFICANT CHANGE UP (ref 3.8–10.5)

## 2022-03-25 PROCEDURE — 81001 URINALYSIS AUTO W/SCOPE: CPT

## 2022-03-25 PROCEDURE — 82550 ASSAY OF CK (CPK): CPT

## 2022-03-25 PROCEDURE — 87086 URINE CULTURE/COLONY COUNT: CPT

## 2022-03-25 PROCEDURE — 93005 ELECTROCARDIOGRAM TRACING: CPT

## 2022-03-25 PROCEDURE — 93010 ELECTROCARDIOGRAM REPORT: CPT

## 2022-03-25 PROCEDURE — 82553 CREATINE MB FRACTION: CPT

## 2022-03-25 PROCEDURE — 99285 EMERGENCY DEPT VISIT HI MDM: CPT | Mod: 25

## 2022-03-25 PROCEDURE — U0005: CPT

## 2022-03-25 PROCEDURE — 84100 ASSAY OF PHOSPHORUS: CPT

## 2022-03-25 PROCEDURE — U0003: CPT

## 2022-03-25 PROCEDURE — 80053 COMPREHEN METABOLIC PANEL: CPT

## 2022-03-25 PROCEDURE — 99285 EMERGENCY DEPT VISIT HI MDM: CPT

## 2022-03-25 PROCEDURE — 80307 DRUG TEST PRSMV CHEM ANLYZR: CPT

## 2022-03-25 PROCEDURE — 96374 THER/PROPH/DIAG INJ IV PUSH: CPT

## 2022-03-25 PROCEDURE — 84702 CHORIONIC GONADOTROPIN TEST: CPT

## 2022-03-25 PROCEDURE — 36415 COLL VENOUS BLD VENIPUNCTURE: CPT

## 2022-03-25 PROCEDURE — 85025 COMPLETE CBC W/AUTO DIFF WBC: CPT

## 2022-03-25 PROCEDURE — 71045 X-RAY EXAM CHEST 1 VIEW: CPT

## 2022-03-25 PROCEDURE — 83735 ASSAY OF MAGNESIUM: CPT

## 2022-03-25 PROCEDURE — 96375 TX/PRO/DX INJ NEW DRUG ADDON: CPT

## 2022-03-25 PROCEDURE — 71045 X-RAY EXAM CHEST 1 VIEW: CPT | Mod: 26

## 2022-03-25 RX ORDER — SODIUM CHLORIDE 9 MG/ML
1000 INJECTION, SOLUTION INTRAVENOUS ONCE
Refills: 0 | Status: COMPLETED | OUTPATIENT
Start: 2022-03-25 | End: 2022-03-25

## 2022-03-25 RX ORDER — ONDANSETRON 8 MG/1
4 TABLET, FILM COATED ORAL ONCE
Refills: 0 | Status: COMPLETED | OUTPATIENT
Start: 2022-03-25 | End: 2022-03-25

## 2022-03-25 RX ADMIN — SODIUM CHLORIDE 1000 MILLILITER(S): 9 INJECTION, SOLUTION INTRAVENOUS at 09:55

## 2022-03-25 RX ADMIN — Medication 1 MILLIGRAM(S): at 09:25

## 2022-03-25 RX ADMIN — Medication 50 MILLIGRAM(S): at 09:10

## 2022-03-25 RX ADMIN — ONDANSETRON 4 MILLIGRAM(S): 8 TABLET, FILM COATED ORAL at 09:15

## 2022-03-25 NOTE — ED PROVIDER NOTE - OBJECTIVE STATEMENT
30yo female with history of etoh abuse BIBEMS from [facility] detox presenting s/p witnessed seizure x 3. Patient states she was given IM meds prior to arrival. Patient at her baseline now without symptoms or post-ictal. Reports tongue biting but no urinary incontinence. Denies fevers, chills, headache, chest pain, palpitations, shortness of breath, cough, nausea, vomiting, diarrhea, hematuria, dysuria, dark stools, focal neurologic symptoms. 32yo female with history of etoh abuse [facility] s/p witnessed seizure x 3. Patient states she was given IM meds prior to arrival. Patient at her baseline now without symptoms or post-ictal. Reports tongue biting but no urinary incontinence. Denies fevers, chills, headache, chest pain, palpitations, shortness of breath, cough, nausea, vomiting, diarrhea, hematuria, dysuria, dark stools, focal neurologic symptoms.

## 2022-03-25 NOTE — ED PROVIDER NOTE - PHYSICAL EXAMINATION
General: Well appearing in no acute distress. Alert and cooperative.   Head: Normocephalic, atraumatic.  Eyes: PERRLA. No conjunctival injection. No scleral icterus. EOMI  ENMT: Atraumatic external nose and ears. Moist mucous membranes. Oropharynx clear.  Neck: Soft and supple. Full ROM without pain. No midline tenderness. No Thyromegaly. No lymphadenopathy.  Cardiac: Regular rate and regular rhythm. No murmurs. Peripheral pulses 2+ and symmetric in all extremities. No LE edema.  Resp: Unlabored respiratory effort. Lungs CTAB. Speaking in full sentences. No wheezes.  Abd: Soft, non-tender, non-distended. No guarding or rebound. No scars.  MSK: Spine midline and non-tender. No CVA tenderness.    Skin: Warm and dry. No rashes, abrasions, or lacerations.  Neuro: AO x 3. Moves all extremities symmetrically. Motor strength and sensation grossly intact. General: Well appearing in no acute distress. Alert and cooperative.   Head: Normocephalic, atraumatic.  Eyes: PERRLA. No conjunctival injection.   ENMT: Atraumatic external nose and ears. Tongue abrasion.  Neck: Soft and supple.   Cardiac: Regular rate and regular rhythm. No murmurs. No LE edema.  Resp: Unlabored respiratory effort. Lungs CTAB. Speaking in full sentences.   Abd: Soft, non-tender, non-distended.   MSK: Spine midline and non-tender.   Skin: Warm and dry.   Neuro: AO x 3. Moves all extremities symmetrically. Motor strength and sensation grossly intact.

## 2022-03-25 NOTE — ED PROVIDER NOTE - PATIENT PORTAL LINK FT
You can access the FollowMyHealth Patient Portal offered by [facility] by registering at the following website: [email or web address]. By joining [employer]’s FollowMyHealth portal, you will also be able to view your health information using other applications (apps) compatible with our system.

## 2022-03-25 NOTE — ED ADULT NURSE NOTE - OBJECTIVE STATEMENT
Pt to ED from detox center for having multiple seizures. Last seizure lasting 6 minutes. Per witness, pt lost consciousness and hit head but was no apneic and never lost pulses. Pt arrives lethargic. Slight ecchymosis noted to R forehead. Reports hx of alcohol abuse. States for "months" she has drank everyday. Reports 1 pint/day as well as 12 beers. Last drink 4 days ago. In a detox program. Reports intermittent nausea. Denies blurred vision, dizziness. Reports anxiousness. Lungs CTA.

## 2022-03-25 NOTE — ED ADULT NURSE REASSESSMENT NOTE - NS ED NURSE REASSESS COMMENT FT1
Report given to detox center for discharge. Medications given, labs, and MD note faxed to place per request. Detox center to send  to  pt. Request that pt remain in stretcher in her room rather than waiting room because of flight risk. VSS. Pt in stable condition. IV to be dc'd upon arrival of transportation.

## 2022-03-25 NOTE — ED PROVIDER NOTE - NSFOLLOWUPINSTRUCTIONS_ED_ALL_ED_FT
Alcohol Use Disorder      Alcohol use disorder is a condition in which drinking disrupts daily life. People with this condition drink too much alcohol and cannot control their drinking.    Alcohol use disorder can cause serious problems with physical health. It can affect the brain, heart, and other internal organs. This disorder can raise the risk for certain cancers and cause problems with mental health, such as depression or anxiety.      What are the causes?    This condition is caused by drinking too much alcohol over time. Some people with this condition drink to cope with or escape from negative life events. Others drink to relieve pain or symptoms of mental illness.      What increases the risk?    You are more likely to develop this condition if:  •You have a family history of alcohol use disorder.      •Your culture encourages drinking to the point of becoming drunk (intoxication).      •You had a mood or conduct disorder in childhood.      •You have been abused.    •You are an adolescent and you:  •Have poor performance in school.      •Have poor supervision or guidance.      •Act on impulse and like taking risks.          What are the signs or symptoms?    Symptoms of this condition include:  •Drinking more than you want to.      •Trying several times without success to drink less.      •Spending a lot of time thinking about alcohol, getting alcohol, drinking, or recovering from drinking.      •Continuing to drink even when it is causing serious problems in your daily life.      •Drinking when it is dangerous to drink, such as before driving a car.      •Needing more and more alcohol to get the same effect you want (building up tolerance).    •Having symptoms of withdrawal when you stop drinking. Withdrawal symptoms may include:  •Trouble sleeping, leading to tiredness (fatigue).      •Mood swings of depression and anxiety.      •Physical symptoms, such as a fast heart rate, rapid breathing, high blood pressure (hypertension), fever, cold sweats, or nausea.      •Seizures.      •Severe confusion.      •Feeling or seeing things that are not there (hallucinations).      •Shaking movements that you cannot control (tremors).          How is this diagnosed?    This condition is diagnosed with an assessment. Your health care provider may start by asking three or four questions about your drinking, or he or she may give you a simple test to take. This helps to get clear information from you.    You may also have a physical exam or lab tests. You may be referred to a substance abuse counselor.      How is this treated?     With education, some people with alcohol use disorder are able to reduce their drinking. Many with this disorder cannot change their drinking behavior on their own and need help from substance use specialists. These specialists are counselors who can help diagnose how severe your disorder is and what type of treatment you need. Treatments may include:  •Detoxification. Detoxification involves quitting drinking with supervision and direction of health care providers. Your health care provider may prescribe prescription medicines within the first week to help lessen withdrawal symptoms. Alcohol withdrawal can be dangerous and life-threatening. Detoxification may be provided in a home, community, or primary care setting, or in a hospital or substance use treatment facility.    •Counseling. This may involve motivational interviewing (MI), family therapy, or cognitive behavioral therapy (CBT). It is provided by substance use treatment counselors or professional therapists. A counselor can address the things you can do to change your drinking behavior and how to maintain the changes. Talk therapy aims to:  •Identify your positive motivations to change.      •Identify and avoid the things that trigger your drinking.      •Help you learn how to plan your behavior change.      •Develop support systems that can help you sustain the change.      •Medicines. Medicines can help treat this disorder by:  •Decreasing cravings.      •Decreasing the positive feeling you have when you drink.      •Causing an uncomfortable physical reaction when you drink (aversion therapy).        •[place] help groups such as Alcoholics Anonymous (AA). These groups are led by people who have quit drinking. The groups provide emotional support, advice, and guidance.      Some people with this condition benefit from a combination of treatments provided by specialized substance use treatment centers.      Follow these instructions at home:     Medicines     •Take over-the-counter and prescription medicines only as told by your health care provider.      •Ask before starting any new medicines, herbs, or supplements.      General instructions     •Ask friends and family members to support your choice to stay sober.      •Avoid situations where alcohol is served.      •Create a plan to deal with tempting situations.      •Attend support groups regularly.      •Practice hobbies or activities you enjoy.      • Do not drink and drive.      •Keep all follow-up visits as told by your health care provider. This is important.        How is this prevented?  •If you drink alcohol:•Limit how much you use to:  •0–1 drink a day for nonpregnant women.      •0–2 drinks a day for men.        •Be aware of how much alcohol is in your drink. In the [place] one drink equals one 12 oz bottle of beer (355 mL), one 5 oz glass of wine (148 mL), or one 1½ oz glass of hard liquor (44 mL).      •If you have a mental health condition, seek treatment. Develop a healthy lifestyle through:  •Meditation or deep breathing.      •Exercise.      •Spending time in nature.       •Listening to music.      •Talking with a trusted friend or family member.      •If you are an adolescent:  •Do not drink alcohol. Avoid gatherings where you might be tempted.      •Do not be afraid to say no if someone offers you alcohol. Speak up about why you do not want to drink. Set a positive example for others around you by not drinking.      •Build relationships with friends who do not drink.          Where to find more information    •Substance Abuse and Mental Health Services Administration: samhsa.gov      •Alcoholics Anonymous: aa.org        Contact a health care provider if:    •You cannot take your medicines as told.      •Your symptoms get worse or you experience symptoms of withdrawal when you stop drinking.      •You start drinking again (relapse) and your symptoms get worse.        Get help right away if:    •You have thoughts about hurting yourself or others.      If you ever feel like you may hurt yourself or others, or have thoughts about taking your own life, get help right away. Go to your nearest emergency department or:    • Call your local emergency services (718 in the [place]       • Call a suicide crisis helpline, such as the National Suicide Prevention Lifeline at 1-110.606.6111. This is open 24 hours a day in the [place]       • Text the Crisis Text Line at 567497 (in the [place]         Summary    •Alcohol use disorder is a condition in which drinking disrupts daily life. People with this condition drink too much alcohol and cannot control their drinking.      •Treatment may include detoxification, counseling, medicines, and support groups.      •Ask friends and family members to support you. Avoid situations where alcohol is served.      •Get help right away if you have thoughts about hurting yourself or others.      This information is not intended to replace advice given to you by your health care provider. Make sure you discuss any questions you have with your health care provider.

## 2022-03-25 NOTE — CHART NOTE - NSCHARTNOTEFT_GEN_A_CORE
SOCIAL WORK NOTE:  THIS WORKER PLACED CALL TO [facility] DETOX #[phone] AND SPOKE WITH NURSING STAFF. THEY ARE ON THE WAY TO  PATIENT NOW AND THE REPORTED THEY SPOKE WITH NURSING STAFF ALREADY. THIS WORKER FAXED CLINICALS TO THEM AT FAX # [phone].

## 2022-03-25 NOTE — ED PROVIDER NOTE - ATTENDING CONTRIBUTION TO CARE
I have personally performed a face to face medical and diagnostic evaluation of the patient. I have discussed with and reviewed the resident's note and agree with the History, ROS, Physical Exam and MDM unless otherwise indicated. A brief summary of my personal evaluation and impression can be found below.    31yoF PMH alcohol abuse (1 pint whiskey and 12 beers daily) was [facility] for witnessed seizure x 3 overnight. Pt was given IM medications but had nausea and could not tolerate PO medications. Pt denies fall or injuries or any complaints except some tremors and nausea. Pt states she had work up for epilepsy due to hx of withdrawal seizures in the past with no findings. Denies chest pain, SOB, fever, cough, abdominal pain. No SI, no AH/VH. No focal numbness or weakness in arms or legs.  Pt appears well on exam with no neurologic deficits and has nontender abdomen, clear lungs, pulses intact in all extremities, moist mucous membranes.   No suspicion for meningitis or neurologic lesion/bleed, no traumatic injuries. Pt is at baseline mental status and has low CIWA at this time. Pt would also prefer to go back to detox center. Checked blood work for electrolyte abnormalities.   Will send back to detox center and discussed return precautions.

## 2022-03-25 NOTE — ED PROVIDER NOTE - CLINICAL SUMMARY MEDICAL DECISION MAKING FREE TEXT BOX
32yo female with history of etoh abuse BIBEMS from [facility] detox presenting s/p witnessed seizure x 3. Patient at baseline, postictal. 30yo female with history of etoh abuse BIBEMS from [facility] presenting s/p witnessed seizure x 3. Patient at baseline, not postictal. Denies symptoms. RRR, lungs clear, abdomen soft, no LE edema, neurovascularly intact. Labs, meds, ecg, cxr, reassess.

## 2022-03-25 NOTE — ED ADULT TRIAGE NOTE - CHIEF COMPLAINT QUOTE
pt BIBA from [facility] for seizures. pt states last drink was 4 days ago and has been receiving oral meds. pt on keppra taking as prescribed. a&ox3, denies incontinence, tongue bite, fevers

## 2022-03-27 LAB
CULTURE RESULTS: SIGNIFICANT CHANGE UP
SPECIMEN SOURCE: SIGNIFICANT CHANGE UP

## 2022-04-19 PROBLEM — Z00.00 ENCOUNTER FOR PREVENTIVE HEALTH EXAMINATION: Status: ACTIVE | Noted: 2022-04-19

## 2022-06-15 ENCOUNTER — APPOINTMENT (OUTPATIENT)
Dept: NEUROLOGY | Facility: CLINIC | Age: 32
End: 2022-06-15

## 2023-11-03 NOTE — ED ADULT NURSE NOTE - CHIEF COMPLAINT QUOTE
Curettage Text: The wound bed was treated with curettage after the biopsy was performed. BIBEMS from detox center in [place] for reoccurring alcohol withdrawal seizures. Unable to obtain other information. No paperwork sent from detox center. MD called to evaluate.

## 2024-03-05 PROBLEM — F41.9 ANXIETY DISORDER, UNSPECIFIED: Chronic | Status: ACTIVE | Noted: 2021-03-16

## 2024-03-05 PROBLEM — F19.239 OTHER PSYCHOACTIVE SUBSTANCE DEPENDENCE WITH WITHDRAWAL, UNSPECIFIED: Chronic | Status: ACTIVE | Noted: 2021-03-16

## 2024-03-05 PROBLEM — F10.10 ALCOHOL ABUSE, UNCOMPLICATED: Chronic | Status: ACTIVE | Noted: 2021-03-16

## 2024-03-05 RX ORDER — LEVETIRACETAM 250 MG/1
1 TABLET, FILM COATED ORAL
Qty: 0 | Refills: 0 | DISCHARGE

## 2024-04-03 NOTE — ED ADULT NURSE NOTE - CAS TRG GEN SKIN COLOR
Regarding: F 47 [place]- Chest pain difficulty walking with breathing concerns  ----- Message from Akbar Rodriguez sent at 4/3/2024  1:10 PM CDT -----  Patient Name: Molina Gambino    Specialist or PCP Name: Dr. Elise Corrales     Symptoms: F 47 [place]- Chest pain difficulty walking with breathing concerns     Pregnant (females aged 13-60.  If Yes, how long?) : No    Call Back # : [phone]    Which State are you currently located in?: [place]    Name of Clinic Site / [facility] Side : [place] Normal for race

## 2024-11-15 NOTE — ED PROVIDER NOTE - SKIN, MLM
The patients insurance has no benefits for services at this location. The patient will have to pay out of pocket for this visit. The patient can contact their insurance carrier to locate providers in their network. By proceeding I acknowledge that I have shared this information with the patient. Pt verbalized acceptance.     ----- Message from [employer] sent at 11/15/2024 11:02 AM CST -----  Regarding: Scheduling Request  Contact: [phone]  Scheduling Request    Appt Type:  Np    Date/Time Preference:Next Available    Caller Name:Pt    Contact Preference:[phone]     Comment:Pt is requesting to schedule from referral for fractured wrist. Nothing available in epic.  Please Call to Advise,Thank you.   Skin normal color for race, warm, dry and intact. No evidence of rash.

## 2025-04-25 NOTE — ED ADULT NURSE NOTE - NS ED NURSE LEVEL OF CONSCIOUSNESS ORIENTATION
Abdomen, soft, nontender, nondistended, normal bowel sounds
Oriented - self; Oriented - place; Oriented - time